# Patient Record
Sex: FEMALE | Race: BLACK OR AFRICAN AMERICAN | Employment: PART TIME | URBAN - METROPOLITAN AREA
[De-identification: names, ages, dates, MRNs, and addresses within clinical notes are randomized per-mention and may not be internally consistent; named-entity substitution may affect disease eponyms.]

---

## 2017-02-20 ENCOUNTER — OFFICE VISIT (OUTPATIENT)
Dept: OBGYN CLINIC | Facility: CLINIC | Age: 47
End: 2017-02-20

## 2017-02-20 VITALS
HEIGHT: 70 IN | WEIGHT: 293 LBS | BODY MASS INDEX: 41.95 KG/M2 | SYSTOLIC BLOOD PRESSURE: 122 MMHG | DIASTOLIC BLOOD PRESSURE: 76 MMHG

## 2017-02-20 DIAGNOSIS — Z01.419 WOMEN'S ANNUAL ROUTINE GYNECOLOGICAL EXAMINATION: Primary | ICD-10-CM

## 2017-02-20 PROBLEM — Z87.42 HISTORY OF PCOS: Status: ACTIVE | Noted: 2017-02-20

## 2017-02-20 PROBLEM — E66.9 OBESITY: Status: ACTIVE | Noted: 2017-02-20

## 2017-02-20 PROCEDURE — 99396 PREV VISIT EST AGE 40-64: CPT | Performed by: OBSTETRICS & GYNECOLOGY

## 2017-02-20 NOTE — PROGRESS NOTES
Yasmanymauricio Veronica is here for a checkup. Patient had Mirena IUD inserted June 2012 for very irregular heavy bleeding and anemia.   Prior to [address] IUD insertion patient was thinking of getting pregnant and had gone to reproductive endocrinologist and stopped pursuing ASSESSMENT:      Normal pelvic findings, IUD in place    PLAN:     I would like to see her once patient explores her pregnancy options. ORDERS:     No orders of the defined types were placed in this encounter.        PRESCRIPTIONS:       No pres

## 2017-07-10 ENCOUNTER — OFFICE VISIT (OUTPATIENT)
Dept: OBGYN CLINIC | Facility: CLINIC | Age: 47
End: 2017-07-10

## 2017-07-10 VITALS — SYSTOLIC BLOOD PRESSURE: 120 MMHG | DIASTOLIC BLOOD PRESSURE: 82 MMHG

## 2017-07-10 DIAGNOSIS — Z30.433 ENCOUNTER FOR IUD REMOVAL AND REINSERTION: Primary | ICD-10-CM

## 2017-07-10 PROBLEM — Z97.5 IUD CONTRACEPTION: Status: ACTIVE | Noted: 2017-07-10

## 2017-07-10 PROCEDURE — 58300 INSERT INTRAUTERINE DEVICE: CPT | Performed by: OBSTETRICS & GYNECOLOGY

## 2017-07-10 PROCEDURE — 88175 CYTOPATH C/V AUTO FLUID REDO: CPT | Performed by: OBSTETRICS & GYNECOLOGY

## 2017-07-10 PROCEDURE — 87624 HPV HI-RISK TYP POOLED RSLT: CPT | Performed by: OBSTETRICS & GYNECOLOGY

## 2017-07-10 NOTE — PROGRESS NOTES
Tati Loose is here for removal of Mirena IUD and insertion of new Mirena IUD. I discussed procedure of insertion, removal with her. Also discussed was the risks of IUD, benefits, alternatives.   Patient would like to proceed with removal of old Mirena IUD and above 100°F, severe abdominal pain, spontaneous expulsion of IUD. Planning on seeing her again in 2 months.     Alverna Romberg, MD  2:02 PM  7/10/2017

## 2017-07-11 LAB — HPV I/H RISK 1 DNA SPEC QL NAA+PROBE: NEGATIVE

## 2017-09-12 ENCOUNTER — OFFICE VISIT (OUTPATIENT)
Dept: OBGYN CLINIC | Facility: CLINIC | Age: 47
End: 2017-09-12

## 2017-09-12 VITALS
WEIGHT: 293 LBS | DIASTOLIC BLOOD PRESSURE: 80 MMHG | HEIGHT: 70 IN | BODY MASS INDEX: 41.95 KG/M2 | SYSTOLIC BLOOD PRESSURE: 122 MMHG

## 2017-09-12 DIAGNOSIS — T83.32XD INTRAUTERINE CONTRACEPTIVE DEVICE THREADS LOST, SUBSEQUENT ENCOUNTER: Primary | ICD-10-CM

## 2017-09-12 PROCEDURE — 99213 OFFICE O/P EST LOW 20 MIN: CPT | Performed by: OBSTETRICS & GYNECOLOGY

## 2017-09-12 NOTE — PROGRESS NOTES
Hernesto Nugent here for IUD check. Patient had IUD inserted July of this year. She has no complaints. She has had minimal vaginal spotting. Patient has had 2 previous [address] IUDs.     On examination:  [address]  Obstetrics and Gynecology  F

## 2017-09-20 ENCOUNTER — ULTRASOUND ENCOUNTER (OUTPATIENT)
Dept: OBGYN CLINIC | Facility: CLINIC | Age: 47
End: 2017-09-20

## 2017-09-20 ENCOUNTER — OFFICE VISIT (OUTPATIENT)
Dept: OBGYN CLINIC | Facility: CLINIC | Age: 47
End: 2017-09-20

## 2017-09-20 DIAGNOSIS — T83.32XD INTRAUTERINE CONTRACEPTIVE DEVICE THREADS LOST, SUBSEQUENT ENCOUNTER: ICD-10-CM

## 2017-09-20 DIAGNOSIS — T83.32XD INTRAUTERINE CONTRACEPTIVE DEVICE THREADS LOST, SUBSEQUENT ENCOUNTER: Primary | ICD-10-CM

## 2017-09-20 PROCEDURE — 99212 OFFICE O/P EST SF 10 MIN: CPT | Performed by: OBSTETRICS & GYNECOLOGY

## 2017-09-20 PROCEDURE — 76857 US EXAM PELVIC LIMITED: CPT | Performed by: OBSTETRICS & GYNECOLOGY

## 2017-09-20 NOTE — PROGRESS NOTES
Rayna Graham is here following ultrasound for consultation. Ultrasound was performed because IUD strings were not visualized. Today's ultrasound shows IUD present in the endometrial cavity in normal position.   I discussed this with Maeve Gonsales and planning on seeing

## 2018-01-16 ENCOUNTER — HOSPITAL ENCOUNTER (OUTPATIENT)
Age: 48
Discharge: HOME | End: 2018-01-16
Attending: FAMILY MEDICINE
Payer: COMMERCIAL

## 2018-01-16 VITALS
DIASTOLIC BLOOD PRESSURE: 95 MMHG | RESPIRATION RATE: 20 BRPM | TEMPERATURE: 98 F | SYSTOLIC BLOOD PRESSURE: 131 MMHG | OXYGEN SATURATION: 95 % | HEART RATE: 91 BPM

## 2018-01-16 DIAGNOSIS — J02.9 ACUTE VIRAL PHARYNGITIS: ICD-10-CM

## 2018-01-16 DIAGNOSIS — J06.9 VIRAL UPPER RESPIRATORY TRACT INFECTION: Primary | ICD-10-CM

## 2018-01-16 DIAGNOSIS — J04.0 ACUTE LARYNGITIS: ICD-10-CM

## 2018-01-16 DIAGNOSIS — R03.0 ELEVATED BLOOD PRESSURE READING: ICD-10-CM

## 2018-01-16 PROCEDURE — 99204 OFFICE O/P NEW MOD 45 MIN: CPT

## 2018-01-16 PROCEDURE — 99203 OFFICE O/P NEW LOW 30 MIN: CPT

## 2018-01-16 RX ORDER — IBUPROFEN 600 MG/1
600 TABLET ORAL ONCE
Status: COMPLETED | OUTPATIENT
Start: 2018-01-16 | End: 2018-01-16

## 2018-01-16 RX ORDER — BENZONATATE 200 MG/1
200 CAPSULE ORAL 3 TIMES DAILY PRN
Qty: 30 CAPSULE | Refills: 0 | Status: SHIPPED | OUTPATIENT
Start: 2018-01-16 | End: 2018-01-26

## 2018-01-16 NOTE — ED INITIAL ASSESSMENT (HPI)
Pt began last week with a lot of sweating, 3 days ago began with a cough, and then lost her voice2 days ago, Now she has sore throat and laryngitis

## 2018-01-16 NOTE — ED PROVIDER NOTES
Patient Seen in: THE MEDICAL [facility] In [facility]    History   Patient presents with:  Cough/URI    Stated Complaint: sorethroat / cough 3 days    HPI    This 19-year-old female presents to the office with 3 days of sore throat, voice hoarseness, non retractions or tachypnea noted. SKIN: Warm and dry.       ED Course   Labs Reviewed - No data to display    ED Course as of Jan 16 1021  ------------------------------------------------------------       MDM     I advised the patient that she has a viral i

## 2018-03-23 ENCOUNTER — HOSPITAL ENCOUNTER (OUTPATIENT)
Age: 48
Discharge: HOME | End: 2018-03-23
Attending: EMERGENCY MEDICINE
Payer: COMMERCIAL

## 2018-03-23 VITALS
DIASTOLIC BLOOD PRESSURE: 92 MMHG | TEMPERATURE: 97 F | RESPIRATION RATE: 18 BRPM | HEART RATE: 92 BPM | SYSTOLIC BLOOD PRESSURE: 142 MMHG | OXYGEN SATURATION: 97 %

## 2018-03-23 DIAGNOSIS — J06.9 UPPER RESPIRATORY TRACT INFECTION, UNSPECIFIED TYPE: Primary | ICD-10-CM

## 2018-03-23 LAB — POCT RAPID STREP: NEGATIVE

## 2018-03-23 PROCEDURE — 99214 OFFICE O/P EST MOD 30 MIN: CPT

## 2018-03-23 PROCEDURE — 99213 OFFICE O/P EST LOW 20 MIN: CPT

## 2018-03-23 PROCEDURE — 87081 CULTURE SCREEN ONLY: CPT | Performed by: EMERGENCY MEDICINE

## 2018-03-23 PROCEDURE — 87430 STREP A AG IA: CPT | Performed by: EMERGENCY MEDICINE

## 2018-03-23 NOTE — ED PROVIDER NOTES
Patient Seen in: THE [facility] In [facility]    History   Patient presents with:  Cough/URI  Post Nasal Drip    Stated Complaint: cough, painful throat when coughing ,drainage    HPI  Patient is a 51-year-old female who states that for the last tenderness, good capillary refill. SKIN: No rash, good turgor. NEURO: Patient answers questions appropriately. No focal deficits appreciated.          ED Course     Labs Reviewed   POCT RAPID STREP - Normal   GRP A STREP CULT, THROAT       ED Course as o

## 2018-11-12 ENCOUNTER — OCC HEALTH (OUTPATIENT)
Dept: OCCUPATIONAL MEDICINE | Age: 48
End: 2018-11-12
Attending: PHYSICIAN ASSISTANT

## 2020-02-07 ENCOUNTER — OFFICE VISIT (OUTPATIENT)
Dept: OBGYN CLINIC | Facility: CLINIC | Age: 50
End: 2020-02-07
Payer: COMMERCIAL

## 2020-02-07 VITALS
DIASTOLIC BLOOD PRESSURE: 104 MMHG | WEIGHT: 293 LBS | BODY MASS INDEX: 41.95 KG/M2 | SYSTOLIC BLOOD PRESSURE: 145 MMHG | HEIGHT: 70 IN

## 2020-02-07 DIAGNOSIS — Z01.419 WOMEN'S ANNUAL ROUTINE GYNECOLOGICAL EXAMINATION: Primary | ICD-10-CM

## 2020-02-07 PROBLEM — G47.30 SLEEP APNEA: Status: ACTIVE | Noted: 2020-02-07

## 2020-02-07 PROBLEM — M54.50 LOW BACK PAIN: Status: ACTIVE | Noted: 2020-02-07

## 2020-02-07 PROBLEM — E28.2 PCOS (POLYCYSTIC OVARIAN SYNDROME): Status: ACTIVE | Noted: 2020-02-07

## 2020-02-07 PROBLEM — E66.01 MORBID OBESITY (HCC): Status: ACTIVE | Noted: 2020-02-07

## 2020-02-07 PROBLEM — Z80.3 FAMILY HISTORY OF BREAST CANCER: Status: ACTIVE | Noted: 2020-02-07

## 2020-02-07 PROCEDURE — 99396 PREV VISIT EST AGE 40-64: CPT | Performed by: OBSTETRICS & GYNECOLOGY

## 2020-02-07 PROCEDURE — 88175 CYTOPATH C/V AUTO FLUID REDO: CPT | Performed by: OBSTETRICS & GYNECOLOGY

## 2020-02-07 PROCEDURE — 87624 HPV HI-RISK TYP POOLED RSLT: CPT | Performed by: OBSTETRICS & GYNECOLOGY

## 2020-02-07 RX ORDER — POLYETHYLENE GLYCOL-3350 AND ELECTROLYTES 236; 6.74; 5.86; 2.97; 22.74 G/274.31G; G/274.31G; G/274.31G; G/274.31G; G/274.31G
POWDER, FOR SOLUTION ORAL
COMMUNITY
Start: 2020-01-15 | End: 2021-04-09

## 2020-02-07 NOTE — PROGRESS NOTES
Ryan Blankenship is here for a checkup. I have not seen her since September 2017. Patient had Mirena IUD inserted July 2017 and had ultrasound for lost IUD strings and ultrasound showed presence of IUD in endometrial cavity.     Her mammogram by January 2020 at [facility] MCG/24HR Intrauterine IUD 1 each by Intrauterine route once. Family History     History reviewed. No pertinent family history.     Social History     Social History    Socioeconomic History      Marital status:       Spouse name: Not on file auscultation  CARDIOVASCULAR: normal S1, S2, RRR  BREASTS: Very large, pendulous firm, nontendder, no palpable masses or nodes, no nipple discharge, no skin changes, no axillary adenopathy  ABDOMEN: Soft, non distended; non tender, no masses  GYNE/: Exte

## 2020-02-09 LAB — HPV I/H RISK 1 DNA SPEC QL NAA+PROBE: NEGATIVE

## 2020-02-24 ENCOUNTER — WALK IN (OUTPATIENT)
Dept: URGENT CARE | Age: 50
End: 2020-02-24

## 2020-02-24 DIAGNOSIS — Z11.1 SCREENING-PULMONARY TB: Primary | ICD-10-CM

## 2020-02-24 PROCEDURE — 86580 TB INTRADERMAL TEST: CPT | Performed by: NURSE PRACTITIONER

## 2020-02-26 ENCOUNTER — WALK IN (OUTPATIENT)
Dept: URGENT CARE | Age: 50
End: 2020-02-26

## 2020-02-26 DIAGNOSIS — Z11.1 ENCOUNTER FOR PPD SKIN TEST READING: Primary | ICD-10-CM

## 2020-02-26 LAB
INDURATION: 5 MM (ref 0–?)
SKIN TEST RESULT: NEGATIVE

## 2020-02-26 PROCEDURE — X1094 NO CHARGE VISIT: HCPCS | Performed by: NURSE PRACTITIONER

## 2021-04-09 ENCOUNTER — OFFICE VISIT (OUTPATIENT)
Dept: OBGYN CLINIC | Facility: CLINIC | Age: 51
End: 2021-04-09
Payer: COMMERCIAL

## 2021-04-09 VITALS — BODY MASS INDEX: 41.95 KG/M2 | HEIGHT: 70 IN | WEIGHT: 293 LBS

## 2021-04-09 DIAGNOSIS — Z01.419 WOMEN'S ANNUAL ROUTINE GYNECOLOGICAL EXAMINATION: Primary | ICD-10-CM

## 2021-04-09 DIAGNOSIS — Z23 NEED FOR VACCINATION: ICD-10-CM

## 2021-04-09 PROCEDURE — 99396 PREV VISIT EST AGE 40-64: CPT | Performed by: OBSTETRICS & GYNECOLOGY

## 2021-04-09 PROCEDURE — 3008F BODY MASS INDEX DOCD: CPT | Performed by: OBSTETRICS & GYNECOLOGY

## 2021-04-09 PROCEDURE — 87624 HPV HI-RISK TYP POOLED RSLT: CPT | Performed by: OBSTETRICS & GYNECOLOGY

## 2021-04-09 PROCEDURE — 88175 CYTOPATH C/V AUTO FLUID REDO: CPT | Performed by: OBSTETRICS & GYNECOLOGY

## 2021-04-09 RX ORDER — CYCLOBENZAPRINE HCL 5 MG
5 TABLET ORAL 3 TIMES DAILY PRN
COMMUNITY
Start: 2021-03-21

## 2021-04-09 NOTE — PROGRESS NOTES
Uzma Alvarado is here for gynecologic checkup. Patient has no complaints. Her menstrual cycles are almost nonexistent due to Mirena IUD. Mirena IUD needs to be replaced next year. Patient is scheduled to have mammogram in 2 weeks.     Her colonoscopy April 2, Not on file      Number of children: Not on file      Years of education: Not on file      Highest education level: Not on file    Occupational History      Not on file    Tobacco Use      Smoking status: Never Smoker      Smokeless tobacco: Never Used Genitalia: Normal appearing, no lesions. Urethral meatus appear wnl, no abnormal discharge or lesions noted.            Bladder: well supported, urethra wnl, no lesions or fissures                     Vagina: normal pink mucosa, no lesions, normal clear dis

## 2022-11-10 ENCOUNTER — OFFICE VISIT (OUTPATIENT)
Dept: INTERNAL MEDICINE CLINIC | Facility: CLINIC | Age: 52
End: 2022-11-10
Payer: COMMERCIAL

## 2022-11-10 VITALS
SYSTOLIC BLOOD PRESSURE: 130 MMHG | DIASTOLIC BLOOD PRESSURE: 80 MMHG | WEIGHT: 293 LBS | HEART RATE: 80 BPM | RESPIRATION RATE: 16 BRPM | HEIGHT: 68 IN | BODY MASS INDEX: 44.41 KG/M2

## 2022-11-10 DIAGNOSIS — E66.01 OBESITY, MORBID, BMI 50 OR HIGHER (HCC): ICD-10-CM

## 2022-11-10 DIAGNOSIS — Z51.81 THERAPEUTIC DRUG MONITORING: Primary | ICD-10-CM

## 2022-11-10 DIAGNOSIS — E28.2 PCOS (POLYCYSTIC OVARIAN SYNDROME): ICD-10-CM

## 2022-11-10 DIAGNOSIS — Z98.84 HISTORY OF LAPAROSCOPIC ADJUSTABLE GASTRIC BANDING: ICD-10-CM

## 2022-11-10 DIAGNOSIS — G47.39 OTHER SLEEP APNEA: ICD-10-CM

## 2022-11-10 PROCEDURE — 99204 OFFICE O/P NEW MOD 45 MIN: CPT | Performed by: NURSE PRACTITIONER

## 2022-11-10 PROCEDURE — 3079F DIAST BP 80-89 MM HG: CPT | Performed by: NURSE PRACTITIONER

## 2022-11-10 PROCEDURE — 3075F SYST BP GE 130 - 139MM HG: CPT | Performed by: NURSE PRACTITIONER

## 2022-11-10 PROCEDURE — 3008F BODY MASS INDEX DOCD: CPT | Performed by: NURSE PRACTITIONER

## 2022-11-10 NOTE — PATIENT INSTRUCTIONS
Welcome to the [facility] Management Program!!  Thank you for placing your trust in our health care team, I look forward to working with you along this journey to better health! Next steps:     1.  Schedule a personal nutrition consultation with one of our registered dieticians. Bring along your food journal (3 days minimum). See journal options below. 2.  Get ekg and a1c done      Please try to work on the following dietary changes this first month:  Daily protein recommendation to start: 100-120 grams  Daily carbohydrate:  <190g  Daily calories: 2,300  1. Drink water with meals and throughout the day, cut down on soda and/or juice if consumed. Consider flavored water options like Bubbly, Spindrift, Hint and Rey. 2.  Eat breakfast daily and focus on having protein with each meal, examples include: greek yogurt, cottage cheese, hard boiled egg, whole grain toast with peanut butter. 3.  Reduce refined carbohydrates and sugars which includes items such as sweets, as well as rice, pasta, and bread and make sure to choose whole grain options when having them with just 1 serving per meal about the size of your inner palm. 4.  Consume non starchy veggies daily working towards making them a good 50% of your daily food intake. Add them to lunch and dinner consistently. 5.  Start a daily probiotic: VSL#3 is recommended, (order on line at www.vsl3. com). Take 1 capsule daily with water for 30 days, then reduce to 1 every other day (this will reduce the cost). Capsules can be left out for 2 weeks, but then must be refrigerated. Please download venus My Fitness Ashok Diaz! Or Net Diary to monitor daily dietary intake and you will be able to see if you are eating the right amount of calories or too much or too little which would hinder weight loss. Additionally this will help to see your daily carbohydrate and protein intake.  When you set the venus up choose 1-2 lbs/week as a goal.  Keeping a paper food journal is an option as well to remain accountable for your choices- this is the start to mindful eating! A low calorie diet has been consistently shown to support weight loss. Continue or start exercising to help establish a routine. If not already exercising begin with 1 day and progress as able with long-term goal of 30 minutes 5 days a week at a minimum. Meditation daily can help manage and control stress. Chronic stress can make weight loss difficult. Exercising is one way to help with stress, but meditation using the CALM Colette or another comparable alternative can be done in your home or place of work with little time commitment. This Colette can also help work on behavior change and improve sleep. Check out the segment under Calm Masterclass and listen to The 4 Pillars of Health. A great way to begin learning about the foundation of lifestyle with practical tips to use in your every day. Check out www.yourweightmatters. org blog for continued daily support and education along this weight loss journey! Patient Resources:     Personal Training/Fitness Classes/Health Coaching     Josias Knox and Gutierrez Sophiaside @ [email or web address] Full fitness center with group fitness and personal training. Discount available as client of [facility]. Health Coaching and Personal Training with Radha Bethea at our [facility]- individual weekly coaching with option to add personal training and small group fitness classes targeted at weight loss- [phone] and/or email @ [email or web address]  360FIT [place] [email or web address] Group Fitness [phone] and/or email Vijaya Cardenas at [email or web address]  [address] with multiple locations: Aetna (www.Splango Media Holdings. Active Optical MEMS), Eat The Frog Fitness (www.Harpoon Medical. Active Optical MEMS), Fit Body Bootcamp (www.Dashi Intelligencep.Active Optical MEMS), Wizdee (www.SpeSo Health. com), The Exercise  (www.exercisecoach.Miproto)     Online Fitness  Fitness  on Whole Foods in 10 DVD series- www. rtojk31OYF. Miproto  Sit and Be Fit - Chair exercise series Www.sitandbefit. org  Hip Hop Fit with Israel Faustin at www.hiphopfit. net     Apps for on the [employer] 7 Minute Workout (orange box with white 7) - free on the go HIIT training colette  Peloton Colette @ StreetfaireHD     Nutrition Trackers and Tools  LoseIT! And My Fitness Pal apps and on line for tracking nutrition  NOOM - virtual health coaching  FitFoundation (healthy meals on the go) in [employer] @ wwwAccountable aawvevxtpuwtx5x. Nelly Cooks MD @ wwwCRMnext and Alex Shah (keto and low carb plans recommended) @ www. Trig Medical.UKX, Metabolic Meals @ www. MyMetabolicMeals. com - individual prepared meals to go  FlyCast, Ameibo, International Business Machines, Every Plate, iApp4Me- on line meal delivery programs for preparation at home  [place] Living Proof in [place] for homemade meals to go @ wwwCybEye. Miproto  Diet Doctor @ www. dietdoctor. com - low carb swaps  YummMarketYze - meal prep and planning colette (www.yummly. com)     Stress Management/Behavior/Mindful Eating  CALM meditation colette (www.calm. Miproto)  Headspace  Am I Hungry? Mindful eating virtual  colette  Www.yourweightmatters. org - Obesity Action Coalition sponsored Blog posts daily  Motivation colette (black box with white \")- daily supportive messages sent to your phone     Books/Video Education/Podcasts  Mindless Eating by Osiris Barton  Why We Get Sick by Kassandra Rebolledo (a book about insulin resistance)  Atomic Habits by Bere Cunningham (a book about taking small steps to promote greater behavior change)   Can't Hurt Me by Dillon Lockett (a book exploring the power of discipline in achieving your goals)  The End of Dieting: How to Live for Life by Dr. Brandi Cruz M.D. or listen to The [address] [facility] [address] Episode 61: Understanding \"Nutritarian\" Eating w/Dr. Brandi Cruz  Your Body in Balance:  The New Science of Food, Hormones, and Health by Dr. Madeleine Aleman  The Menopause Diet Plan by Mara Mayo and [facility] - A HOSP AT [facility]  The Complete Guide to fasting by Dr. Ivis Nunes, [address] [facility] by Claudia Matson, Ph.D, R.D. Weight Loss Surgery Will Not Treat Food Addiction by Harriet Buerger Ph.D  The [address] [place] [address] on plant based nutrition  Fed Up - documentary about obesity (Free on [place])  The Truth About Sugar - documentary on sugar (Free on [place], https://youHighTower Advisorsu. be/2V5wrtoXC6a)  The Dr. Janet Greenfield by Dr. Maryanne Gongora MD  Fitlosophy Fitspiration - journal to better health (found at [employer] in fitness aisle)  What Happened to You?- a look at the impact trauma has on behavior written by Uyen Ascencio and Dr. Kely Beth Again by Manuella Alpers - discovering your true self after trauma  Pina Cumberland talk on "[employer],Inc.", The Call to Courage  Podcasts: The Exam Room by the Physician's Committee, Nutrition Facts by Dr. Jacquelin Zamora    We are here to support you with weight loss, but please remember that you still need your primary care provider for your routine health maintenance.

## 2022-11-11 ENCOUNTER — LAB ENCOUNTER (OUTPATIENT)
Dept: LAB | Age: 52
End: 2022-11-11
Attending: NURSE PRACTITIONER
Payer: COMMERCIAL

## 2022-11-11 ENCOUNTER — PATIENT MESSAGE (OUTPATIENT)
Dept: INTERNAL MEDICINE CLINIC | Facility: CLINIC | Age: 52
End: 2022-11-11

## 2022-11-11 ENCOUNTER — EKG ENCOUNTER (OUTPATIENT)
Dept: LAB | Age: 52
End: 2022-11-11
Attending: NURSE PRACTITIONER
Payer: COMMERCIAL

## 2022-11-11 DIAGNOSIS — E66.01 OBESITY, MORBID, BMI 50 OR HIGHER (HCC): ICD-10-CM

## 2022-11-11 DIAGNOSIS — Z51.81 THERAPEUTIC DRUG MONITORING: ICD-10-CM

## 2022-11-11 LAB
ATRIAL RATE: 67 BPM
EST. AVERAGE GLUCOSE BLD GHB EST-MCNC: 117 MG/DL (ref 68–126)
HBA1C MFR BLD: 5.7 % (ref ?–5.7)
P AXIS: 74 DEGREES
P-R INTERVAL: 192 MS
Q-T INTERVAL: 412 MS
QRS DURATION: 78 MS
QTC CALCULATION (BEZET): 435 MS
R AXIS: 35 DEGREES
T AXIS: 17 DEGREES
VENTRICULAR RATE: 67 BPM

## 2022-11-11 PROCEDURE — 93010 ELECTROCARDIOGRAM REPORT: CPT | Performed by: INTERNAL MEDICINE

## 2022-11-11 PROCEDURE — 93005 ELECTROCARDIOGRAM TRACING: CPT

## 2022-11-11 PROCEDURE — 83036 HEMOGLOBIN GLYCOSYLATED A1C: CPT | Performed by: NURSE PRACTITIONER

## 2022-11-11 NOTE — TELEPHONE ENCOUNTER
From: Noy Comes  To:  TOAN Myrick  Sent: 11/11/2022 1:47 PM CST  Subject: Question regarding HEMOGLOBIN A1C    So what is the next step

## 2022-11-14 ENCOUNTER — PATIENT MESSAGE (OUTPATIENT)
Dept: INTERNAL MEDICINE CLINIC | Facility: CLINIC | Age: 52
End: 2022-11-14

## 2022-11-14 RX ORDER — PHENTERMINE HYDROCHLORIDE 15 MG/1
15 CAPSULE ORAL EVERY MORNING
Qty: 30 CAPSULE | Refills: 1 | Status: SHIPPED | OUTPATIENT
Start: 2022-11-14

## 2022-11-14 NOTE — TELEPHONE ENCOUNTER
From: Gretchen Service  To:  TOAN Castro  Sent: 11/11/2022 1:47 PM CST  Subject: Question regarding HEMOGLOBIN A1C    So what is the next step

## 2022-11-17 ENCOUNTER — PATIENT MESSAGE (OUTPATIENT)
Dept: INTERNAL MEDICINE CLINIC | Facility: CLINIC | Age: 52
End: 2022-11-17

## 2022-11-18 ENCOUNTER — PATIENT MESSAGE (OUTPATIENT)
Dept: INTERNAL MEDICINE CLINIC | Facility: CLINIC | Age: 52
End: 2022-11-18

## 2022-11-20 RX ORDER — PEN NEEDLE, DIABETIC 30 GX3/16"
1 NEEDLE, DISPOSABLE MISCELLANEOUS DAILY
Qty: 100 EACH | Refills: 1 | Status: SHIPPED | OUTPATIENT
Start: 2022-11-20

## 2022-11-20 RX ORDER — LIRAGLUTIDE 6 MG/ML
3 INJECTION, SOLUTION SUBCUTANEOUS DAILY
Qty: 15 ML | Refills: 1 | Status: SHIPPED | OUTPATIENT
Start: 2022-11-20

## 2023-01-26 ENCOUNTER — PATIENT MESSAGE (OUTPATIENT)
Dept: INTERNAL MEDICINE CLINIC | Facility: CLINIC | Age: 53
End: 2023-01-26

## 2023-01-26 ENCOUNTER — OFFICE VISIT (OUTPATIENT)
Dept: INTERNAL MEDICINE CLINIC | Facility: CLINIC | Age: 53
End: 2023-01-26
Payer: COMMERCIAL

## 2023-01-26 VITALS
SYSTOLIC BLOOD PRESSURE: 120 MMHG | WEIGHT: 293 LBS | DIASTOLIC BLOOD PRESSURE: 89 MMHG | RESPIRATION RATE: 16 BRPM | BODY MASS INDEX: 44.41 KG/M2 | HEIGHT: 68 IN | HEART RATE: 82 BPM | OXYGEN SATURATION: 97 %

## 2023-01-26 DIAGNOSIS — Z98.84 HISTORY OF LAPAROSCOPIC ADJUSTABLE GASTRIC BANDING: ICD-10-CM

## 2023-01-26 DIAGNOSIS — E28.2 PCOS (POLYCYSTIC OVARIAN SYNDROME): ICD-10-CM

## 2023-01-26 DIAGNOSIS — R73.03 PREDIABETES: ICD-10-CM

## 2023-01-26 DIAGNOSIS — E66.01 OBESITY, MORBID, BMI 50 OR HIGHER (HCC): ICD-10-CM

## 2023-01-26 DIAGNOSIS — Z51.81 THERAPEUTIC DRUG MONITORING: Primary | ICD-10-CM

## 2023-01-26 DIAGNOSIS — G47.39 OTHER SLEEP APNEA: ICD-10-CM

## 2023-01-26 PROCEDURE — 3079F DIAST BP 80-89 MM HG: CPT | Performed by: NURSE PRACTITIONER

## 2023-01-26 PROCEDURE — 99214 OFFICE O/P EST MOD 30 MIN: CPT | Performed by: NURSE PRACTITIONER

## 2023-01-26 PROCEDURE — 3074F SYST BP LT 130 MM HG: CPT | Performed by: NURSE PRACTITIONER

## 2023-01-26 PROCEDURE — 3008F BODY MASS INDEX DOCD: CPT | Performed by: NURSE PRACTITIONER

## 2023-01-26 RX ORDER — PHENTERMINE HYDROCHLORIDE 15 MG/1
15 CAPSULE ORAL EVERY MORNING
Qty: 30 CAPSULE | Refills: 1 | Status: SHIPPED | OUTPATIENT
Start: 2023-01-26

## 2023-01-26 NOTE — PATIENT INSTRUCTIONS
Next steps:  1. Fill your prescribed medication and take as discussed and prescribed: saxenda 3mg daily and phentermine 15mg   2. Schedule a personal nutrition consultation with one of our registered dieticians  3. Check with insurance on weekly injection (ie. MIGUEL ANGEL)      Please try to work on the following dietary changes:    1. Drink water with meals and throughout the day, cut down on soda and/or juice if consumed. Consider flavored water options like Bubbly, Spindrift, Hint and Rey. 2.  Eat breakfast daily and focus on having protein with each meal, examples include: greek yogurt, cottage cheese, hard boiled egg, whole grain toast with peanut butter. 3.  Reduce refined carbohydrates and sugars which includes items such as sweets, as well as rice, pasta, and bread and make sure to choose whole grain options when having them with just 1 serving per meal about the size of your inner palm. 4.  Consume non starchy veggies daily working towards making them a good 50% of your daily food intake. Add them to lunch and dinner consistently. 5.  Start a daily probiotic: VSL#3 is recommended, (order on line at www.vsl3. com). Take 1 capsule daily with water for 30 days, then reduce to 1 every other day (this will reduce the cost). Capsules can be left out for 2 weeks, but then must be refrigerated. Please download venus My Fitness Chika Shoulders! Or Net Diary to monitor daily dietary intake and you will be able to see if you are eating the right amount of calories or too much or too little which would hinder weight loss. Additionally this will help to see your daily carbohydrate and protein intake. When you set the venus up choose 1-2 lbs/week as a goal.  Keeping a paper food journal is an option as well to remain accountable for your choices- this is the start to mindful eating! A low calorie diet has been consistently shown to support weight loss. Continue or start exercising to help establish a routine.  If not already exercising begin with 1 day and progress as able with long-term goal of 30 minutes 5 days a week at a minimum. Meditation daily can help manage and control stress. Chronic stress can make weight loss difficult. Exercising is one way to help with stress, but meditation using the CALM Colette or another comparable alternative can be done in your home or place of work with little time commitment. This Colette can also help work on behavior change and improve sleep. Check out the segment under Calm Masterclass and listen to The 4 Pillars of Health. A great way to begin learning about the foundation of lifestyle with practical tips to use in your every day. Check out www.yourweightmatters. org blog for continued daily support and education along this weight loss journey! Patient Resources:     Personal Training/Fitness Classes/Health Coaching     Josias Knox and Gutierrez Sophiaside @ [email or web address]/ Full fitness center with group fitness and personal training. Discount available as client of [facility]. Health Coaching and Personal Training with Todd Ledesma at our [facility]- individual weekly coaching with option to add personal training and small group fitness classes targeted at weight loss- [phone] and/or email @ [email or web address]  360FIT [place] [email or web address] Group Fitness [phone] and/or email Reshma Monge at [email or web address]  [address] [facility] with multiple locations: Aetna (www.U4EA), Eat The Frog Fitness (www.Fashiolista. Guidekick), Fit Body Bootcamp (www.Ontuitivebodybootcamp.com), Open Box Technologies Fitness (www.Girl Meets Dress. Guidekick), The Exercise  (www.exercisecoach.Guidekick)     Online Fitness  Fitness  on Whole Foods in 10 DVD series- www. knslk27DLN. Guidekick  Sit and Be Fit - Chair exercise series Www.sitandbefit. org  Hip Hop Fit with Israel Faustin at www.hiphopfit. net     Apps for on the D.light Design 7 Minute Workout (orange box with white 7) - free on the go HIIT training colette  Peloton Colette @ www"Dash Labs, Inc."     Nutrition Trackers and Tools  LoseIT! And My Fitness Pal apps and on line for tracking nutrition  NOOM - virtual health coaching  FitFoundation (healthy meals on the go) in [facility] [place]-SCI @ www. whzsepjkedwwk8z. Luzma Moses MD @ www.Selltag and Caroline Jade (keto and low carb plans recommended) @ www. LKFRIF05.IXK, Metabolic Meals @ www. MyMetabolicMeals. com - individual prepared meals to go  Thrinacia, TransBiodiesel, International Business Machines, Every Plate, Vedero Software- on line meal delivery programs for preparation at home  AK Tip or Skip in [place] for homemade meals to go @ wwwInnovative Healthcare  Diet Doctor @ www. dietdoctor. com - low carb swaps  YuFliqq - meal prep and planning colette (www.yummly. com)     Stress Management/Behavior/Mindful Eating  CALM meditation colette (www.calmSkedo)  Headspace  Am I Hungry? Mindful eating virtual  colette  Www.yourweightmatters. org - Obesity Action Coalition sponsored Blog posts daily  Motivation colette (black box with white \")- daily supportive messages sent to your phone     Books/Video Education/Podcasts  Mindless Eating by Kathleen Dueñas  Why We Get Sick by Jacques Celestin (a book about insulin resistance)  Atomic Habits by Han Lynn (a book about taking small steps to promote greater behavior change)   Can't Hurt Me by Gerry Diaz (a book exploring the power of discipline in achieving your goals)  The End of Dieting: How to Live for Life by Dr. Rodrick Holder M.D. or listen to The [address] Episode 61: Understanding \"Nutritarian\" Eating w/Dr. Rodrick Holder  Your Body in Balance: The World Fuel Services Corporation of Food, Hormones, and Health by Dr. Gaye Knutson  The Menopause Diet Plan by oMlly Bonilla [facility] HOSP AT [facility]  The Complete Guide to fasting by Dr. Jose A Segundo, [address] [facility] by Kenn Jo, Ph.D, R.D.   Weight Loss Surgery Will Not Treat Food Addiction by Norman Riedel Ph.D  The Game Changers- Vaurumix Documentary on plant based nutrition  Fed Up - documentary about obesity (Free on [place])  The Truth About Sugar - documentary on sugar (Free on [place], https://youJamgleu. be/9D9tjipNX5z)  The Dr. Arlo Dakin by Dr. Yessenia Alonso MD  Fitlosophy Fitspiration - journal to better health (found at [employer] in fitness aisle)  What Happened to You?- a look at the impact trauma has on behavior written by Delon De Oliveira and Dr. Estefany Amado Again by Mahnaz Rice - discovering your true self after trauma  Cinda Santos talk on Treemo Labs, The Call to Courage  Podcasts: The Exam Room by the Physician's Committee, Nutrition Facts by Dr. Enid Rdz    We are here to support you with weight loss, but please remember that you still need your primary care provider for your routine health maintenance.

## 2023-01-27 ENCOUNTER — PATIENT MESSAGE (OUTPATIENT)
Dept: INTERNAL MEDICINE CLINIC | Facility: CLINIC | Age: 53
End: 2023-01-27

## 2023-01-27 RX ORDER — SEMAGLUTIDE 1.7 MG/.75ML
1.7 INJECTION, SOLUTION SUBCUTANEOUS WEEKLY
Qty: 3 ML | Refills: 0 | Status: SHIPPED | OUTPATIENT
Start: 2023-01-27 | End: 2023-02-18

## 2023-01-30 ENCOUNTER — TELEPHONE (OUTPATIENT)
Dept: INTERNAL MEDICINE CLINIC | Facility: CLINIC | Age: 53
End: 2023-01-30

## 2023-02-05 ENCOUNTER — PATIENT MESSAGE (OUTPATIENT)
Dept: INTERNAL MEDICINE CLINIC | Facility: CLINIC | Age: 53
End: 2023-02-05

## 2023-02-06 RX ORDER — SEMAGLUTIDE 1.7 MG/.75ML
1.7 INJECTION, SOLUTION SUBCUTANEOUS WEEKLY
Qty: 3 ML | Refills: 0 | Status: SHIPPED | OUTPATIENT
Start: 2023-02-06 | End: 2023-02-28

## 2023-02-06 NOTE — TELEPHONE ENCOUNTER
From: Aleja Russ  Sent: 2/5/2023 1:54 PM CST  To: [facility] Clinical Staff  Subject: thomas Duval I [facility] and they said they need a prescription for the wegovy can you please send it to them so they can start my medication

## 2023-02-15 ENCOUNTER — TELEPHONE (OUTPATIENT)
Dept: INTERNAL MEDICINE CLINIC | Facility: CLINIC | Age: 53
End: 2023-02-15

## 2023-02-15 NOTE — TELEPHONE ENCOUNTER
Guero concerned with order for Wegovy 1.7 mg - no titration history. Patient was on [facility] [address] [facility] - ok to dispense dose as ordered. Called to pharmacist Aron Claros.

## 2023-02-27 NOTE — TELEPHONE ENCOUNTER
Requesting Wegoy  LOV: 1/26/23  RTC: 2 months  Last Relevant Labs: na  Filled: 2/6/23 #3ml with 0 refills  Wegovy 1.7 mg     Future Appointments   Date Time Provider Rakesh Travis   3/30/2023 10:40 AM TOAN Anderson [id number]

## 2023-03-30 ENCOUNTER — OFFICE VISIT (OUTPATIENT)
Dept: INTERNAL MEDICINE CLINIC | Facility: CLINIC | Age: 53
End: 2023-03-30
Payer: COMMERCIAL

## 2023-03-30 VITALS
HEIGHT: 68 IN | OXYGEN SATURATION: 96 % | RESPIRATION RATE: 16 BRPM | HEART RATE: 88 BPM | BODY MASS INDEX: 44.41 KG/M2 | SYSTOLIC BLOOD PRESSURE: 122 MMHG | DIASTOLIC BLOOD PRESSURE: 84 MMHG | WEIGHT: 293 LBS

## 2023-03-30 DIAGNOSIS — Z98.84 HISTORY OF LAPAROSCOPIC ADJUSTABLE GASTRIC BANDING: ICD-10-CM

## 2023-03-30 DIAGNOSIS — G47.39 OTHER SLEEP APNEA: ICD-10-CM

## 2023-03-30 DIAGNOSIS — E28.2 PCOS (POLYCYSTIC OVARIAN SYNDROME): ICD-10-CM

## 2023-03-30 DIAGNOSIS — R73.03 PREDIABETES: ICD-10-CM

## 2023-03-30 DIAGNOSIS — Z51.81 THERAPEUTIC DRUG MONITORING: Primary | ICD-10-CM

## 2023-03-30 DIAGNOSIS — E66.01 OBESITY, MORBID, BMI 50 OR HIGHER (HCC): ICD-10-CM

## 2023-03-30 PROCEDURE — 99213 OFFICE O/P EST LOW 20 MIN: CPT | Performed by: NURSE PRACTITIONER

## 2023-03-30 PROCEDURE — 3008F BODY MASS INDEX DOCD: CPT | Performed by: NURSE PRACTITIONER

## 2023-03-30 PROCEDURE — 3079F DIAST BP 80-89 MM HG: CPT | Performed by: NURSE PRACTITIONER

## 2023-03-30 PROCEDURE — 3074F SYST BP LT 130 MM HG: CPT | Performed by: NURSE PRACTITIONER

## 2023-03-30 RX ORDER — IBUPROFEN 600 MG/1
TABLET ORAL
COMMUNITY
Start: 2023-01-30

## 2023-03-30 RX ORDER — PSEUDOEPHED/ACETAMINOPH/DIPHEN 30MG-500MG
TABLET ORAL
COMMUNITY
Start: 2023-02-01

## 2023-03-30 RX ORDER — PHENTERMINE HYDROCHLORIDE 15 MG/1
15 CAPSULE ORAL EVERY MORNING
Qty: 90 CAPSULE | Refills: 0 | Status: SHIPPED | OUTPATIENT
Start: 2023-03-30

## 2023-03-30 NOTE — PATIENT INSTRUCTIONS
Next steps:  1. Fill your prescribed medication and take as discussed and prescribed: wegovy 2.4mg and phentermine   2. Schedule a personal nutrition consultation with one of our registered dieticians     1. Drink water with meals and throughout the day, cut down on soda and/or juice if consumed. Consider flavored water options like Bubbly, Spindrift, Hint and Rey. 2.  Eat breakfast daily and focus on having protein with each meal, examples include: greek yogurt, cottage cheese, hard boiled egg, whole grain toast with peanut butter. 3.  Reduce refined carbohydrates and sugars which includes items such as sweets, as well as rice, pasta, and bread and make sure to choose whole grain options when having them with just 1 serving per meal about the size of your inner palm. 4.  Consume non starchy veggies daily working towards making them a good 50% of your daily food intake. Add them to lunch and dinner consistently. 5.  Start a daily probiotic: VSL#3 is recommended, (order on line at www.vsl3. com). Take 1 capsule daily with water for 30 days, then reduce to 1 every other day (this will reduce the cost). Capsules can be left out for 2 weeks, but then must be refrigerated. Please download venus My Fitness Negar Reading! Or Net Diary to monitor daily dietary intake and you will be able to see if you are eating the right amount of calories or too much or too little which would hinder weight loss. Additionally this will help to see your daily carbohydrate and protein intake. When you set the venus up choose 1-2 lbs/week as a goal.  Keeping a paper food journal is an option as well to remain accountable for your choices- this is the start to mindful eating! A low calorie diet has been consistently shown to support weight loss. Continue or start exercising to help establish a routine. If not already exercising begin with 1 day and progress as able with long-term goal of 30 minutes 5 days a week at a minimum. Meditation daily can help manage and control stress. Chronic stress can make weight loss difficult. Exercising is one way to help with stress, but meditation using the CALM Colette or another comparable alternative can be done in your home or place of work with little time commitment. This Colette can also help work on behavior change and improve sleep. Check out the segment under Calm Masterclass and listen to The 4 Pillars of Health. A great way to begin learning about the foundation of lifestyle with practical tips to use in your every day. Check out www.yourweightmatters. org blog for continued daily support and education along this weight loss journey! Patient Resources:     Personal Training/Fitness Classes/Health Coaching     Josias 112 and Gutierrez Sophiaside @ [email or web address] Full fitness center with group fitness and personal training. Discount available as client of [facility]. Health Coaching and Personal Training with Efrain Mena at our [facility]- individual weekly coaching with option to add personal training and small group fitness classes targeted at weight loss- [phone] and/or email @ [email or web address]  360FIT [place] [email or web address] Group Fitness [phone] and/or email Kate Laureano at [email or web address]  [address] [facility] with multiple locations: Aetna (www.Kythera Biopharmaceuticals), Limos.com The Radiant Communications (www.Znode. SGX Pharmaceuticals), Fit Body Bootcamp (www.Kliqedp.SGX Pharmaceuticals), shoply (www.Pocket Video), The Exercise  (www.exercisecoach.SGX Pharmaceuticals)     Online Fitness  Fitness  on Whole Foods in 10 DVD series- www. obmxx06GFX. SGX Pharmaceuticals  Sit and Be Fit - Chair exercise series Www.sitandbefit. org  Hip Hop Fit with Israel Faustin at www.hiphopfit. net     Apps for on the Stop Being Watched 7 Minute Workout (orange box with white 7) - free on the go HIIT training colette  Peloton Colette @ www. Questra     Nutrition Trackers and Tools  LoseIT! And My Fitness Pal apps and on line for tracking nutrition  NOOM - virtual health coaching  FitFoundation (healthy meals on the go) in [facility] @ www. imnjdeggivkev9q. Sabine Yu MD @ www.TopTenREVIEWSdMobilePaks and Dock Starch (keto and low carb plans recommended) @ www. SHNWOX53.AYK, Metabolic Meals @ www. MyMetabolicMeals. com - individual prepared meals to go  Textronics, Tudou, International Business Machines, Every Plate, Anesiva- on line meal delivery programs for preparation at home  [place] LaserLeap in [place] for homemade meals to go @ www1calendar. Mayberry Media  Diet Doctor @ www. dietdoctor. Mayberry Media - low carb swaps  Yummly - meal prep and planning colette (www.yummly. com)     Stress Management/Behavior/Mindful Eating  CALM meditation colette (www.Ilesfay Technology Group)  Headspace  Am I Hungry? Mindful eating virtual  colette  Www.yourweightmatters. org - Obesity Action Coalition sponsored Blog posts daily  Motivation colette (black box with white \")- daily supportive messages sent to your phone     Books/Video Education/Podcasts  Mindless Eating by Heather Peterson  Why We Get Sick by Cheng Kulkarni (a book about insulin resistance)  Atomic Habits by Candida Qureshi (a book about taking small steps to promote greater behavior change)   Can't Hurt Me by Mallory Remedies (a book exploring the power of discipline in achieving your goals)  The End of Dieting: How to Live for Life by Dr. Braulio Lloyd M.D. or listen to The [facility] Episode 61: Understanding \"Nutritarian\" Eating w/Dr. Braulio Lloyd  Your Body in Balance: The World Fuel Services Corporation of Food, Hormones, and Health by Dr. Sophia Shoemaker  The Menopause Diet Plan by Nanci Rasheed and [facility]OSP AT [facility]  The Complete Guide to fasting by Dr. Joe Loya, [address] [facility] by Damien Nicholson, Ph.D, R.D.   Weight Loss Surgery Will Not Treat Food Addiction by Yana Bailey Ph.D  The Game Changers- bitHound Documentary on plant based nutrition  Fed Up - documentary about obesity (Free on Utube)  The Truth About Sugar - documentary on sugar (Free on Utube, https://youtu. be/9O1vvdhAW4a)  The Dr. Tin Marshall by Dr. Robert Jaquez MD  Fitlosophy Fitspiration - journal to better health (found at [employer] in fitness aisle)  What Happened to You?- a look at the impact trauma has on behavior written by Bere Rocha and Dr. Davenport Lot Again by Genevieve Webb - discovering your true self after trauma  Geena Damico talk on [place], The Call to SurveySnap  Podcasts: The Exam Room by the Physician's Committee, Nutrition Facts by Dr. Maher Shows    We are here to support you with weight loss, but please remember that you still need your primary care provider for your routine health maintenance.

## 2023-05-04 RX ORDER — SEMAGLUTIDE 2.4 MG/.75ML
INJECTION, SOLUTION SUBCUTANEOUS
Qty: 9 ML | Refills: 0 | Status: SHIPPED | OUTPATIENT
Start: 2023-05-04

## 2023-06-09 ENCOUNTER — LAB ENCOUNTER (OUTPATIENT)
Dept: LAB | Facility: HOSPITAL | Age: 53
End: 2023-06-09
Attending: OBSTETRICS & GYNECOLOGY
Payer: COMMERCIAL

## 2023-06-09 ENCOUNTER — PATIENT MESSAGE (OUTPATIENT)
Dept: OBGYN CLINIC | Facility: CLINIC | Age: 53
End: 2023-06-09

## 2023-06-09 ENCOUNTER — OFFICE VISIT (OUTPATIENT)
Dept: OBGYN CLINIC | Facility: CLINIC | Age: 53
End: 2023-06-09

## 2023-06-09 ENCOUNTER — TELEPHONE (OUTPATIENT)
Dept: OBGYN CLINIC | Facility: CLINIC | Age: 53
End: 2023-06-09

## 2023-06-09 VITALS
WEIGHT: 293 LBS | DIASTOLIC BLOOD PRESSURE: 86 MMHG | HEART RATE: 89 BPM | BODY MASS INDEX: 50 KG/M2 | SYSTOLIC BLOOD PRESSURE: 141 MMHG

## 2023-06-09 DIAGNOSIS — Z01.419 ENCOUNTER FOR GYNECOLOGICAL EXAMINATION WITHOUT ABNORMAL FINDING: ICD-10-CM

## 2023-06-09 DIAGNOSIS — N92.6 MENSES, IRREGULAR: Primary | ICD-10-CM

## 2023-06-09 DIAGNOSIS — Z12.31 VISIT FOR SCREENING MAMMOGRAM: ICD-10-CM

## 2023-06-09 DIAGNOSIS — N92.6 MENSES, IRREGULAR: ICD-10-CM

## 2023-06-09 LAB
ESTRADIOL SERPL-MCNC: 30.9 PG/ML
FSH SERPL-ACNC: 25.7 MIU/ML
LH SERPL-ACNC: 30.4 MIU/ML

## 2023-06-09 PROCEDURE — 82670 ASSAY OF TOTAL ESTRADIOL: CPT

## 2023-06-09 PROCEDURE — 83001 ASSAY OF GONADOTROPIN (FSH): CPT

## 2023-06-09 PROCEDURE — 36415 COLL VENOUS BLD VENIPUNCTURE: CPT

## 2023-06-09 PROCEDURE — 83002 ASSAY OF GONADOTROPIN (LH): CPT

## 2023-06-09 NOTE — TELEPHONE ENCOUNTER
Please call pt and tell her that her IUD was placed in 7/10/2017 and since it was placed for bleeding purposes it is due to be exchanged. Otherwise if just for contraception would need to be replaced in 2024. Will decide about whether to replace her mirena based on her hormone tests drawn today since she is 51yo.

## 2023-06-10 NOTE — TELEPHONE ENCOUNTER
From: Laura Ho  To: Evelyn Lopez.  MD Alina  Sent: 6/9/2023 4:46 PM CDT  Subject: Mammogram     Good evening Dr Castillo Barnes already had my mammogram on June 1,2023 at [address] showed the nurse my test results during my visit with you off my [email or web address] venus

## 2023-06-10 NOTE — TELEPHONE ENCOUNTER
To CAP to please advise, does IUD need to be replaced or removed based on hormone labs drawn on 6/9?

## 2023-06-12 NOTE — TELEPHONE ENCOUNTER
Thierry Murphy MD  P [facility] Ob/Gyne Clinical Staff  Pt labs are in the menopausal range so she should not need IUD replaced when it is removed later this year. Yazmin sent to pt with this information.

## 2023-06-29 ENCOUNTER — OFFICE VISIT (OUTPATIENT)
Dept: INTERNAL MEDICINE CLINIC | Facility: CLINIC | Age: 53
End: 2023-06-29
Payer: COMMERCIAL

## 2023-06-29 VITALS
WEIGHT: 293 LBS | BODY MASS INDEX: 44.41 KG/M2 | HEART RATE: 78 BPM | SYSTOLIC BLOOD PRESSURE: 144 MMHG | RESPIRATION RATE: 16 BRPM | DIASTOLIC BLOOD PRESSURE: 84 MMHG | HEIGHT: 68 IN

## 2023-06-29 DIAGNOSIS — G47.39 OTHER SLEEP APNEA: ICD-10-CM

## 2023-06-29 DIAGNOSIS — E66.01 OBESITY, MORBID, BMI 50 OR HIGHER (HCC): ICD-10-CM

## 2023-06-29 DIAGNOSIS — Z51.81 THERAPEUTIC DRUG MONITORING: Primary | ICD-10-CM

## 2023-06-29 DIAGNOSIS — E28.2 PCOS (POLYCYSTIC OVARIAN SYNDROME): ICD-10-CM

## 2023-06-29 DIAGNOSIS — R73.03 PREDIABETES: ICD-10-CM

## 2023-06-29 PROCEDURE — 3079F DIAST BP 80-89 MM HG: CPT | Performed by: NURSE PRACTITIONER

## 2023-06-29 PROCEDURE — 3077F SYST BP >= 140 MM HG: CPT | Performed by: NURSE PRACTITIONER

## 2023-06-29 PROCEDURE — 99213 OFFICE O/P EST LOW 20 MIN: CPT | Performed by: NURSE PRACTITIONER

## 2023-06-29 PROCEDURE — 3008F BODY MASS INDEX DOCD: CPT | Performed by: NURSE PRACTITIONER

## 2023-06-29 RX ORDER — PHENTERMINE HYDROCHLORIDE 15 MG/1
15 CAPSULE ORAL EVERY MORNING
Qty: 90 CAPSULE | Refills: 0 | Status: CANCELLED | OUTPATIENT
Start: 2023-06-29

## 2023-06-29 RX ORDER — SEMAGLUTIDE 2.4 MG/.75ML
2.4 INJECTION, SOLUTION SUBCUTANEOUS WEEKLY
Qty: 9 ML | Refills: 0 | Status: CANCELLED | OUTPATIENT
Start: 2023-06-29

## 2023-06-29 RX ORDER — PHENTERMINE HYDROCHLORIDE 30 MG/1
30 CAPSULE ORAL EVERY MORNING
Qty: 30 CAPSULE | Refills: 2 | Status: SHIPPED | OUTPATIENT
Start: 2023-06-29

## 2023-07-24 DIAGNOSIS — E66.01 OBESITY, MORBID, BMI 50 OR HIGHER (HCC): Primary | ICD-10-CM

## 2023-07-25 RX ORDER — SEMAGLUTIDE 2.4 MG/.75ML
INJECTION, SOLUTION SUBCUTANEOUS
Qty: 9 ML | Refills: 0 | Status: SHIPPED | OUTPATIENT
Start: 2023-07-25

## 2023-07-25 NOTE — TELEPHONE ENCOUNTER
Requesting   Requested Prescriptions     Pending Prescriptions Disp Refills    WEGOVY 2.4 MG/0.75ML Subcutaneous Solution Auto-injector [Pharmacy Med Name: WEGOVY PEN INJ 0.75ML 4'S 2.4MG] 9 mL 3     Sig: INJECT 0.75 ML ( 2.4 MG TOTAL ) UNDER THE SKIN ONCE A WEEK.      LOV: 6/29/23  RTC: 3 months  Filled: 5/4/23 #9 with 0 refills    Future Appointments   Date Time Provider Rakesh Travis   9/7/2023 10:40 AM TOAN Rankin [id number]

## 2023-09-07 ENCOUNTER — TELEMEDICINE (OUTPATIENT)
Dept: INTERNAL MEDICINE CLINIC | Facility: CLINIC | Age: 53
End: 2023-09-07
Payer: COMMERCIAL

## 2023-09-07 ENCOUNTER — PATIENT MESSAGE (OUTPATIENT)
Dept: INTERNAL MEDICINE CLINIC | Facility: CLINIC | Age: 53
End: 2023-09-07

## 2023-09-07 DIAGNOSIS — E66.01 OBESITY, MORBID, BMI 50 OR HIGHER (HCC): ICD-10-CM

## 2023-09-07 DIAGNOSIS — E28.2 PCOS (POLYCYSTIC OVARIAN SYNDROME): ICD-10-CM

## 2023-09-07 DIAGNOSIS — G47.39 OTHER SLEEP APNEA: ICD-10-CM

## 2023-09-07 DIAGNOSIS — Z51.81 THERAPEUTIC DRUG MONITORING: Primary | ICD-10-CM

## 2023-09-07 DIAGNOSIS — R73.03 PREDIABETES: ICD-10-CM

## 2023-09-07 DIAGNOSIS — Z98.84 HISTORY OF LAPAROSCOPIC ADJUSTABLE GASTRIC BANDING: ICD-10-CM

## 2023-09-07 PROCEDURE — 99213 OFFICE O/P EST LOW 20 MIN: CPT | Performed by: NURSE PRACTITIONER

## 2023-09-07 NOTE — PATIENT INSTRUCTIONS
Next steps:  1. Fill your prescribed medication and take as discussed and prescribed: wegovy 2.4mg weekly and phentermine 30mg   2. Schedule a personal nutrition consultation with one of our registered dieticians     Please try to work on the following dietary changes:    1. Drink water with meals and throughout the day, cut down on soda and/or juice if consumed. Consider flavored water options like Bubbly, Spindrift, Hint and Rey. 2.  Eat breakfast daily and focus on having protein with each meal, examples include: greek yogurt, cottage cheese, hard boiled egg, whole grain toast with peanut butter. 3.  Reduce refined carbohydrates and sugars which includes items such as sweets, as well as rice, pasta, and bread and make sure to choose whole grain options when having them with just 1 serving per meal about the size of your inner palm. 4.  Consume non starchy veggies daily working towards making them a good 50% of your daily food intake. Add them to lunch and dinner consistently. 5.  Start a daily probiotic: VSL#3 is recommended, (order on line at www.vsl3. com). Take 1 capsule daily with water for 30 days, then reduce to 1 every other day (this will reduce the cost). Capsules can be left out for 2 weeks, but then must be refrigerated. Please download venus My Fitness Fabiano Garrido! Or Net Diary to monitor daily dietary intake and you will be able to see if you are eating the right amount of calories or too much or too little which would hinder weight loss. Additionally this will help to see your daily carbohydrate and protein intake. When you set the venus up choose 1-2 lbs/week as a goal.  Keeping a paper food journal is an option as well to remain accountable for your choices- this is the start to mindful eating! A low calorie diet has been consistently shown to support weight loss. Continue or start exercising to help establish a routine.  If not already exercising begin with 1 day and progress as able with long-term goal of 30 minutes 5 days a week at a minimum. Meditation daily can help manage and control stress. Chronic stress can make weight loss difficult. Exercising is one way to help with stress, but meditation using the CALM Colette or another comparable alternative can be done in your home or place of work with little time commitment. This Colette can also help work on behavior change and improve sleep. Check out the segment under Calm Masterclass and listen to The 4 Pillars of Health. A great way to begin learning about the foundation of lifestyle with practical tips to use in your every day. Check out www.yourweightmatters. org blog for continued daily support and education along this weight loss journey! Patient Resources:     Personal Training/Fitness Classes/Health Coaching     Josias Knox and Gutierrez Sophiaside @ [email or web address]/ Full fitness center with group fitness and personal training. Discount available as client of [facility]. Health Coaching and Personal Training with Joshua Metz at our [employer]- individual weekly coaching with option to add personal training and small group fitness classes targeted at weight loss- [phone] and/or email @ Jamey Cogan. [email or web address]  360FIT [place] [email or web address] Group Fitness [phone] and/or email Hannah Krishnan at [email or web address]  [address] [facility] with multiple locations: Aetna (www.Our Family Kitchen), Eat The TravelMuse Fitness (www.TOK.tv. Aries Cove), Fit Body Bootcamp (www.Adonitbodybootcamp.Aries Cove), Artisan Pharma (www.Presence Networks. Aries Cove), The Exercise  (www.exercisecoach.Aries Cove)     Online Fitness  Fitness  on Whole Foods in 10 DVD series- www. tswzl36EOM. Aries Cove  Sit and Be Fit - Chair exercise series Www.sitandbefit. org  Hip Hop Fit with Israel Faustin at www.hiphopfit. net     Apps for on the Bank of New York Company 7 Minute Workout (orange box with white 7) - free on the go HIIT training colette  Peloton Colette @ www. Pangea Universal Holdings     Nutrition Trackers and Tools  LoseIT! And My Fitness Pal apps and on line for tracking nutrition  NOOM - virtual health coaching  FitFoundation (healthy meals on the go) in [facility] @ www. pzauqwtmjhgai5o. Travis Hernandez MD @ www.Inlet TechnologiesdBlume Distillation and Darron Hoffmann (keto and low carb plans recommended) @ www. ZREEFU34.LXN, Metabolic Meals @ www. MyMetabolicMeals. com - individual prepared meals to go  RV ID, Innov Analysis Systems, International Business Machines, Every Plate, Proteus Biomedical- on line meal delivery programs for preparation at home  AK Banki.ru in [place] for homemade meals to go @ wwwLitepoint  Diet Doctor @ www. dietdoctor. AtheroNova - low carb swaps  Invenergy - meal prep and planning colette (www.yummly. com)     Stress Management/Behavior/Mindful Eating  CALM meditation colette (www.calmDialoggy)  Headspace  Am I Hungry? Mindful eating virtual  colette  Www.yourweightmatters. org - Obesity Action Coalition sponsored Blog posts daily  Motivation colette (black box with white \")- daily supportive messages sent to your phone     Books/Video Education/Podcasts  Mindless Eating by Wilfred Simmons  Why We Get Sick by Micah Perla (a book about insulin resistance)  Atomic Habits by Diandra Valles (a book about taking small steps to promote greater behavior change)   Can't Hurt Me by Olimpia Hyatt (a book exploring the power of discipline in achieving your goals)  The End of Dieting: How to Live for Life by Dr. Marlen Goode M.D. or listen to The 1995 [facility] Episode 61: Understanding \"Nutritarian\" Eating w/Dr. Marlen Goode  Your Body in Balance: The World Fuel Services Corporation of Food, Hormones, and Health by Dr. Holly Mejia  The Menopause Diet Plan by Stacie Disla [facility] HOSP AT [facility]  The Complete Guide to fasting by Dr. Gucci Mullen, [address] [facility] by Ady Jeffery, Ph.D, R.D.   Weight Loss Surgery Will Not Treat Food Addiction by Dottie Fernando Ph.D  The Game Changers- Netflix Documentary on plant based nutrition  Fed Up - documentary about obesity (Free on Utube)  The Truth About Sugar - documentary on sugar (Free on Utube, https://youtu. be/3Q4rjffLA2h)  The Dr. Criselda Noyola by Dr. Rodrick Patel MD  Fitlosophy Fitspiration - journal to better health (found at [employer] in fitness aisle)  What Happened to You?- a look at the impact trauma has on behavior written by Kal Theodore and Dr. Ora Del Rosario Again by Pavan Tripp - discovering your true self after trauma  Tamiko Riggs talk on Netflix, The Call to Courage  Podcasts: The Exam Room by the Physician's Committee, Nutrition Facts by Dr. Stephon Hein    We are here to support you with weight loss, but please remember that you still need your primary care provider for your routine health maintenance.

## 2023-09-07 NOTE — PROGRESS NOTES
[facility] WEIGHT MANAGEMENT VIRTUAL ENCOUNTER     Amy Meehan verbally consents to a Virtual/Telephone Check-In service on 09/07/23   Patient understands and accepts financial responsibility for any deductible, co-insurance and/or co-pays associated with this service. HISTORY OF PRESENT ILLNESS  Patient presents with: Other: F/u on weight management     Amy Meehan is a 46year old female is being evaluated as a video visit using Telemedicine with live, interactive video and audio    Weight gain/loss since LOV based on home monitoring:   Home scale: #318 lbs  Has lost  # 7lbs since LOV 2 months ago     Compliance with medication: wegovy 2.4mg weekly and phentermine 30mg    Tolerating well, helping with decreasing appetite and no side effects     Had to change today's visit to video, since she couldn't leave work today  Is doing well! Went on cruise this past summer   Has noticed a little hair loss   Hasn't had any sciatica issues   Exercise/Activity: 5x/ week, via walking, free weights (3 days per week)   Nutrition: eating regular meals, +protein, minimal veggies. +interm. . tracking reports  Stress is manageable   Sleep: 7-8 hours/night, waking up feeling rested    Denies chest pain, shortness of breath, dizziness, blurred vision, headache, paresthesia, nausea/vomiting.      Wt Readings from Last 6 Encounters:  06/29/23 : (!) 325 lb (147.4 kg)  06/09/23 : (!) 328 lb (148.8 kg)  03/30/23 : (!) 341 lb (154.7 kg)  01/26/23 : (!) 363 lb (164.7 kg)  11/10/22 : (!) 383 lb (173.7 kg)  04/09/21 : (!) 357 lb (161.9 kg)         Subjective  REVIEW OF SYSTEMS  GENERAL HEALTH: feels well otherwise, denied any fevers chills or night sweats   RESPIRATORY: denies shortness of breath   CARDIOVASCULAR: denies chest pain  GI: denies abdominal pain  PSYCH: denies any mood changes    Objective  EXAM  Reviewed most recent set of vitals   Physical Exam:  GENERAL: well developed, well nourished, in no apparent distress, speaking in full sentences comfortably   SKIN: warm, pink, dry without rashes to exposed area   EYES: conjunctiva pink  HEENT: atraumatic, normocephalic  LUNGS: normal work of breathing, non labored  CARDIO: normal work, no exertion  EXTREMITIES: no cyanosis, no clubbing, no edema  NEURO: Oriented times three  PSYCH: pleasant, cooperative, normal mood and affect    No results found for: WBC, RBC, HGB, HCT, MCV, MCH, MCHC, RDW, PLT, MPV  No results found for: GLU, BUN, BUNCREA, CREATSERUM, ANIONGAP, GFR, GFRNAA, GFRAA, CA, OSMOCALC, ALKPHO, AST, ALT, ALKPHOS, BILT, TP, ALB, GLOBULIN, AGRATIO, NA, K, CL, CO2  Lab Results   Component Value Date     11/11/2022    A1C 5.7 (H) 11/11/2022     No results found for: CHOLEST, TRIG, HDL, LDL, VLDL, TCHDLRATIO, NONHDLC, CHOLHDLRATIO, NONHDLC, CALCNONHDL  No results found for: T4F, TSH, TSHT4  No results found for: B12, VITB12  No results found for: VITD, QVITD, HIPN07ZG    semaglutide-weight management (WEGOVY) 2.4 MG/0.75ML Subcutaneous Solution Auto-injector, INJECT 0.75 ML ( 2.4 MG TOTAL ) UNDER THE SKIN ONCE A WEEK., Disp: 9 mL, Rfl: 0  Phentermine HCl 30 MG Oral Cap, Take 1 capsule (30 mg total) by mouth every morning., Disp: 30 capsule, Rfl: 2  ibuprofen 600 MG Oral Tab, , Disp: , Rfl:   ACETAMINOPHEN EXTRA STRENGTH 500 MG Oral Tab, , Disp: , Rfl:   Insulin Pen Needle (PEN NEEDLES) 32G X 4 MM Does not apply Misc, Inject 1 each into the skin daily. , Disp: 100 each, Rfl: 1  cyclobenzaprine 5 MG Oral Tab, Take 1 tablet (5 mg total) by mouth 3 (three) times daily as needed. , Disp: , Rfl:   levonorgestrel 20 MCG/24HR Intrauterine IUD, 20 mcg (1 each total) by Intrauterine route once., Disp: , Rfl:     No current facility-administered medications on file prior to visit.       ASSESSMENT  Analyzed weight data:       Diagnoses and all orders for this visit:    Therapeutic drug monitoring    Obesity, morbid, BMI 50 or higher (HCC)    PCOS (polycystic ovarian syndrome)    Other sleep apnea    Prediabetes    History of laparoscopic adjustable gastric banding      PLAN  Continue with medications: wegovy 2.4mg weekly   Continue with medications: phentermine 30mg   --advised of side effects and adverse effects of this medication  Contradictions: none  Reviewed labs  PCOS, has done metformin in the past   Snoring, +bairon but doesn't want to use machine (encouraged to maybe get a new one done)   Hx of lap band in 2010   Prediabetes- last a1c was 5.7% on 11/2022  Keep up the good work   Hair loss, try adding in biotin   Advised to monitor blood pressure and pulse at home/ given parameters to review and contact provider. Nutrition: low carb diet/ recommended to eat breakfast daily/ regular protein intake  Follow up with dietitian and psychologist as recommended. Discussed the role of sleep and stress in weight management. Counseled on comprehensive weight loss plan including attention to nutrition, exercise and behavior/stress management for success. See patient instruction below for more details. Discussed strategies to overcome barriers to successful weight loss and weight maintenance  FITTE: ACSM recommendations (150-300 minutes/ week in active weight loss)       There are no Patient Instructions on file for this visit. No follow-ups on file. Patient verbalizes understanding. Total time spent on chart review, pre-charting, obtaining history, counseling, and educating, reviewing labs was 27 minutes. Pt understands phone/video evaluation is not a substitute for face to face examination or emergency care. Pt advised to go to the ER or call 911 for worsening symptoms or acute distress. Please note that the following visit was completed using two-way, real-time interactive audio and/or video communication.   This has been done in good nancy to provide continuity of care in the best interest of the provider-patient relationship, due to the ongoing public health crisis/national emergency and because of restrictions of visitation. There are limitations of this visit as no physical exam could be performed. Every conscious effort was taken to allow for sufficient and adequate time. This billing was spent on reviewing labs, medications, radiology tests and decision making. Appropriate medical decision-making and tests are ordered as detailed in the plan of care above. NOTE TO PATIENT: The 21st Century Cures Act makes clinical notes like these available to patients in the interest of transparency. Clinical notes are medical documents used by physicians and care providers to communicate with each other. These documents include medical language and terminology, abbreviations, and treatment information that may sound technical and at times possibly unfamiliar. In addition, at times, the verbiage may appear blunt or direct. These documents are one tool providers use to communicate relevant information and clinical opinions of the care providers in a way that allows common understanding of the clinical context.      Master Mireles, APRN  9/7/2023

## 2023-10-05 ENCOUNTER — PATIENT MESSAGE (OUTPATIENT)
Dept: INTERNAL MEDICINE CLINIC | Facility: CLINIC | Age: 53
End: 2023-10-05

## 2023-10-06 RX ORDER — PHENTERMINE HYDROCHLORIDE 37.5 MG/1
37.5 TABLET ORAL
Qty: 30 TABLET | Refills: 1 | Status: SHIPPED | OUTPATIENT
Start: 2023-10-06

## 2023-10-06 NOTE — TELEPHONE ENCOUNTER
From: Gretchen Service  To:  Master Credit  Sent: 10/5/2023 6:00 PM CDT  Subject: New Prescription     Good evening Dr Camryn Gomez    Per our previous conversation regarding the dose increase on my phentermine medication I am ready to go to the next step can you please call in the refill order to my [address] I have on file in [place] on [address] [facility]

## 2023-10-16 DIAGNOSIS — E66.01 OBESITY, MORBID, BMI 50 OR HIGHER (HCC): ICD-10-CM

## 2023-10-17 RX ORDER — SEMAGLUTIDE 2.4 MG/.75ML
INJECTION, SOLUTION SUBCUTANEOUS
Qty: 9 ML | Refills: 0 | Status: SHIPPED | OUTPATIENT
Start: 2023-10-17 | End: 2023-10-23

## 2023-10-17 NOTE — TELEPHONE ENCOUNTER
Requesting   Requested Prescriptions     Pending Prescriptions Disp Refills    WEGOVY 2.4 MG/0.75ML Subcutaneous Solution Auto-injector [Pharmacy Med Name: WEGOVY PEN INJ 0.75ML 4'S 2.4MG] 9 mL 3     Sig: INJECT 0.75 ML ( 2.4 MG TOTAL ) UNDER THE SKIN ONCE A WEEK.      LOV: 9/7/23  RTC: 3 months  Filled: 7/25/23 #9 with 0 refills    Future Appointments   Date Time Provider Rakesh Vásquezi   12/7/2023 11:00 AM TOAN Graf [id number]

## 2023-10-23 ENCOUNTER — TELEPHONE (OUTPATIENT)
Dept: INTERNAL MEDICINE CLINIC | Facility: CLINIC | Age: 53
End: 2023-10-23

## 2023-10-23 DIAGNOSIS — E66.01 OBESITY, MORBID, BMI 50 OR HIGHER (HCC): ICD-10-CM

## 2023-10-23 RX ORDER — SEMAGLUTIDE 2.4 MG/.75ML
2.4 INJECTION, SOLUTION SUBCUTANEOUS WEEKLY
Qty: 9 ML | Refills: 0 | Status: SHIPPED | OUTPATIENT
Start: 2023-10-23

## 2023-10-23 NOTE — TELEPHONE ENCOUNTER
LOV 9/7/23  Future Appointments   Date Time Provider Rakesh Angy   12/7/2023 11:00 AM TOAN Valdovinos [id number]     Last Rx was sent on 10/17/23 #9mL + 0 to [facility]. [facility] typically sends refill request as soon as they fill Rx, so that they have next one. I'm not sure if you'd like to send now or not.

## 2023-10-23 NOTE — TELEPHONE ENCOUNTER
Approved    Prior authorization approved Case ID: [id number]      Payer: Arun Clements 19    [phone]    [phone]   [facility]:[id number];KIKO:JONE; Review Type:Prior Auth; Coverage Start Date:09/23/2023; Coverage End Date:10/22/2024;    Approval Details    Authorized from September 23, 2023 to October 22, 2024      Electronic appeal: Not supported   View History     Medication Being Authorized     semaglutide-weight management (WEGOVY) 2.4 MG/0.75ML Subcutaneous Solution Auto-injector

## 2023-12-07 ENCOUNTER — OFFICE VISIT (OUTPATIENT)
Dept: INTERNAL MEDICINE CLINIC | Facility: CLINIC | Age: 53
End: 2023-12-07
Payer: COMMERCIAL

## 2023-12-07 VITALS
HEART RATE: 90 BPM | RESPIRATION RATE: 16 BRPM | WEIGHT: 293 LBS | SYSTOLIC BLOOD PRESSURE: 138 MMHG | DIASTOLIC BLOOD PRESSURE: 82 MMHG | BODY MASS INDEX: 44.41 KG/M2 | HEIGHT: 68 IN

## 2023-12-07 DIAGNOSIS — E28.2 PCOS (POLYCYSTIC OVARIAN SYNDROME): ICD-10-CM

## 2023-12-07 DIAGNOSIS — G47.39 OTHER SLEEP APNEA: ICD-10-CM

## 2023-12-07 DIAGNOSIS — R73.03 PREDIABETES: ICD-10-CM

## 2023-12-07 DIAGNOSIS — E66.01 OBESITY, MORBID, BMI 50 OR HIGHER (HCC): ICD-10-CM

## 2023-12-07 DIAGNOSIS — Z51.81 THERAPEUTIC DRUG MONITORING: Primary | ICD-10-CM

## 2023-12-07 PROCEDURE — 3008F BODY MASS INDEX DOCD: CPT | Performed by: NURSE PRACTITIONER

## 2023-12-07 PROCEDURE — 99214 OFFICE O/P EST MOD 30 MIN: CPT | Performed by: NURSE PRACTITIONER

## 2023-12-07 PROCEDURE — 3075F SYST BP GE 130 - 139MM HG: CPT | Performed by: NURSE PRACTITIONER

## 2023-12-07 PROCEDURE — 3079F DIAST BP 80-89 MM HG: CPT | Performed by: NURSE PRACTITIONER

## 2023-12-07 RX ORDER — PHENTERMINE HYDROCHLORIDE 37.5 MG/1
37.5 TABLET ORAL
Qty: 30 TABLET | Refills: 2 | Status: SHIPPED | OUTPATIENT
Start: 2023-12-07

## 2023-12-07 NOTE — PATIENT INSTRUCTIONS
Next steps:  1. Fill your prescribed medication and take as discussed and prescribed: wegovy 2.4mg weekly and phentermine 37.5mg   2. Schedule a personal nutrition consultation with one of our registered dieticians     Please try to work on the following dietary changes:  Daily protein recommendation to start:  grams  Daily carbohydrate: <160g  Daily calories: 2,000-2,100  1. Drink water with meals and throughout the day, cut down on soda and/or juice if consumed. Consider flavored water options like Bubbly, Spindrift, Hint and Rey. 2.  Eat breakfast daily and focus on having protein with each meal, examples include: greek yogurt, cottage cheese, hard boiled egg, whole grain toast with peanut butter. 3.  Reduce refined carbohydrates and sugars which includes items such as sweets, as well as rice, pasta, and bread and make sure to choose whole grain options when having them with just 1 serving per meal about the size of your inner palm. 4.  Consume non starchy veggies daily working towards making them a good 50% of your daily food intake. Add them to lunch and dinner consistently. 5.  Start a daily probiotic: VSL#3 is recommended, (order on line at www.vsl3. com). Take 1 capsule daily with water for 30 days, then reduce to 1 every other day (this will reduce the cost). Capsules can be left out for 2 weeks, but then must be refrigerated. Please download venus My Fitness [place]! Or Net Diary to monitor daily dietary intake and you will be able to see if you are eating the right amount of calories or too much or too little which would hinder weight loss. Additionally this will help to see your daily carbohydrate and protein intake. When you set the venus up choose 1-2 lbs/week as a goal.  Keeping a paper food journal is an option as well to remain accountable for your choices- this is the start to mindful eating! A low calorie diet has been consistently shown to support weight loss.      Continue or start exercising to help establish a routine. If not already exercising begin with 1 day and progress as able with long-term goal of 30 minutes 5 days a week at a minimum. Meditation daily can help manage and control stress. Chronic stress can make weight loss difficult. Exercising is one way to help with stress, but meditation using the CALM Colette or another comparable alternative can be done in your home or place of work with little time commitment. This Colette can also help work on behavior change and improve sleep. Check out the segment under Calm Masterclass and listen to The 4 Pillars of Health. A great way to begin learning about the foundation of lifestyle with practical tips to use in your every day. Check out www.yourweightmatters. org blog for continued daily support and education along this weight loss journey! Patient Resources:     Personal Training/Fitness Classes/Health Coaching     Josias Knox and Matt Deliaashly @ [email or web address]/ Full fitness center with group fitness and personal training. Discount available as client of [facility]. Health Coaching and Personal Training with Bradford Young at our [employer]- individual weekly coaching with option to add personal training and small group fitness classes targeted at weight loss- [phone] and/or email @ [email or web address] Technology. org  360FIT [place] [email or web address] Group Fitness [phone] and/or email Cooper Bonilla at [email or web address]. [place]  [address] [facility] with multiple locations: Aetna (www.ISH), Eat The Frog Fitness (www.Pathgather. [place]), Fit Body Bootcamp (www.Traveebodybootcamp.[place]), Labelby.me Fitness (www.Sand Sign. [place]), The Exercise  (www.exercisecoach.[place])     Online Fitness  Fitness  on [employer] in 10 DVD series- www. pkcyn98XKL. [place]  Sit and Be Fit - Chair exercise series Www.sitandbefit. org  Hip Hop Fit with Israel Faustin at www.hiphopfit. net     Apps for on the MediaPhy 7 Minute Workout (orange box with white 7) - free on the go HIIT training colette  Peloton Colette @ wwwEpy.io     Nutrition Trackers and Tools  LoseIT! And My Fitness Pal apps and on line for tracking nutrition  NOOM - virtual health coaching  FitFoundation (healthy meals on the go) in [facility] @ www. jivvlowtndbqu6a. Fara Magaña MD @ www.Hungry LocaltromdKadmon and Blaine Cornell (keto and low carb plans recommended) @ www. ZZJSIO45.VKE, Metabolic Meals @ www. InsightSquaredals. com - individual prepared meals to go  Elanti Systems, Insightera, [employer], Every Plate, [employer] and [employer] (CNHS)- on line meal delivery programs for preparation at home  [place] CaseStack in [place] for homemade meals to go @ wwwEdgeSpring. Pro[employer] Manufacturing  Diet Doctor @ www. dietdoctor. com - low carb swaps  Yummly - meal prep and planning colette (www.yummly. com)     Stress Management/Behavior/Mindful Eating  CALM meditation colette (www.calm. [employer])  Headspace  Am I Hungry? Mindful eating virtual  colette  Www.yourweightmatters. org - Obesity Action Coalition sponsored Blog posts daily  Motivation colette (black box with white \")- daily supportive messages sent to your phone     Books/Video Education/Podcasts  Mindless Eating by Marilynn Cockayne  Why We Get Sick by Octavia Keating (a book about insulin resistance)  Atomic Habits by Wong Ask (a book about taking small steps to promote greater behavior change)   Can't Hurt Me by Kala Alvarez (a book exploring the power of discipline in achieving your goals)  The End of Dieting: How to Live for Life by Dr. Yannick Lewis M.D. or listen to The [address] Episode 61: Understanding \"Nutritarian\" Eating w/Dr. Yannick Lewis  Your Body in Balance:  The [employer] of Food, Hormones, and Health by Dr. Marisol Harding  The Menopause Diet Plan by Renuka Astorga and [facility] AT [facility]  The Complete Guide to fasting by Dr. Bam Berry, [address] [facility] by Sony Huynh Felipe Meza, Ph.D, R.D. Weight Loss Surgery Will Not Treat Food Addiction by Yanet Perea Ph.D  The [address] [place] [address] on plant based nutrition  Fed Up - documentary about obesity (Free on [place])  The Truth About Sugar - documentary on sugar (Free on Utube, https://youtu. be/9A5wdmzWO9e)  The Dr. Adriel Rodriguez by Dr. Chris Tena MD  Fitlosophy Fitspiration - journal to better health (found at [employer] in fitness aisle)  What Happened to You?- a look at the impact trauma has on behavior written by Peter Nunes and Dr. Yeni Millan Again by Gretchen Kimble - discovering your true self after trauma  Aura Ayala talk on [employer]R [employer], The Call to Cozi Groupage  Podcasts: The Exam Room by the Physician's Committee, Nutrition Facts by Dr. Gaviota Crouch    We are here to support you with weight loss, but please remember that you still need your primary care provider for your routine health maintenance.

## 2024-01-08 DIAGNOSIS — E66.01 OBESITY, MORBID, BMI 50 OR HIGHER (HCC): ICD-10-CM

## 2024-01-08 RX ORDER — SEMAGLUTIDE 2.4 MG/.75ML
2.4 INJECTION, SOLUTION SUBCUTANEOUS WEEKLY
Qty: 9 ML | Refills: 0 | Status: SHIPPED | OUTPATIENT
Start: 2024-01-08

## 2024-01-08 NOTE — TELEPHONE ENCOUNTER
Requesting   Requested Prescriptions     Pending Prescriptions Disp Refills    semaglutide-weight management (WEGOVY) 2.4 MG/0.75ML Subcutaneous Solution Auto-injector 9 mL 0     Sig: Inject 0.75 mL (2.4 mg total) as directed once a week.     LOV: 12/7/23  RTC: 3 months  Filled: 10/23/23 #9 with 0 refills    Future Appointments   Date Time Provider Department Center   4/11/2024 11:00 AM Ana Rosa Reyes APRN [facility] [id number]

## 2024-03-21 DIAGNOSIS — E66.01 OBESITY, MORBID, BMI 50 OR HIGHER (HCC): ICD-10-CM

## 2024-03-21 RX ORDER — SEMAGLUTIDE 2.4 MG/.75ML
INJECTION, SOLUTION SUBCUTANEOUS
Qty: 9 ML | Refills: 3 | OUTPATIENT
Start: 2024-03-21

## 2024-03-21 NOTE — TELEPHONE ENCOUNTER
Requesting   Requested Prescriptions     Pending Prescriptions Disp Refills    WEGOVY 2.4 MG/0.75ML Subcutaneous Solution Auto-injector [Pharmacy Med Name: WEGOVY PEN INJ 0.75ML 4'S 2.4MG] 9 mL 3     Sig: INJECT 0.75 ML (2.4 MG TOTAL) AS DIRECTED ONCE A WEEK UNDER THE SKIN       LOV: 12/07/2023  RTC: in about 3 months  Filled: 01/08/2024 #9ml with 0 refills    Future Appointments   Date Time Provider Department Center   4/11/2024 11:00 AM Ana Rosa Reyes APRN [facility] [id number]

## 2024-03-29 DIAGNOSIS — E66.01 OBESITY, MORBID, BMI 50 OR HIGHER (HCC): Primary | ICD-10-CM

## 2024-03-30 NOTE — TELEPHONE ENCOUNTER
Requesting   Requested Prescriptions     Pending Prescriptions Disp Refills    PHENTERMINE HCL 37.5 MG Oral Tab [Pharmacy Med Name: PHENTERMINE 37.5MG TABLETS] 30 tablet 0     Sig: TAKE 1 TABLET(37.5 MG) BY MOUTH EVERY MORNING BEFORE BREAKFAST     LOV: 12/7/23  RTC: 3 months  Filled: 12/7/23 #30 with 2 refills    Future Appointments   Date Time Provider Department Center   4/11/2024 11:00 AM Ana Rosa Reyes APRN [facility] [id number]   8/30/2024 10:20 AM Ruby Fuller MD [facility]

## 2024-03-31 RX ORDER — PHENTERMINE HYDROCHLORIDE 37.5 MG/1
37.5 TABLET ORAL
Qty: 30 TABLET | Refills: 0 | Status: SHIPPED | OUTPATIENT
Start: 2024-03-31

## 2024-04-02 DIAGNOSIS — E66.01 OBESITY, MORBID, BMI 50 OR HIGHER (HCC): ICD-10-CM

## 2024-04-02 RX ORDER — SEMAGLUTIDE 2.4 MG/.75ML
INJECTION, SOLUTION SUBCUTANEOUS
Qty: 9 ML | Refills: 0 | Status: SHIPPED | OUTPATIENT
Start: 2024-04-02

## 2024-04-02 NOTE — TELEPHONE ENCOUNTER
Requesting   Requested Prescriptions     Pending Prescriptions Disp Refills    WEGOVY 2.4 MG/0.75ML Subcutaneous Solution Auto-injector [Pharmacy Med Name: WEGOVY PEN INJ 0.75ML 4'S 2.4MG] 9 mL 3     Sig: INJECT 0.75 ML (2.4 MG TOTAL) ONCE A WEEK AS DIRECTED     LOV: 12/7/23  RTC: 3 months  Filled: 1/8/24 #9 with 0 refills    Future Appointments   Date Time Provider Department Center   4/11/2024 11:00 AM Ana Rosa Reyes APRN [facility] [id number]   8/30/2024 10:20 AM Ruby Fuller MD [facility]

## 2024-04-11 ENCOUNTER — OFFICE VISIT (OUTPATIENT)
Dept: INTERNAL MEDICINE CLINIC | Facility: CLINIC | Age: 54
End: 2024-04-11
Payer: COMMERCIAL

## 2024-04-11 VITALS
HEIGHT: 68 IN | WEIGHT: 293 LBS | RESPIRATION RATE: 16 BRPM | BODY MASS INDEX: 44.41 KG/M2 | DIASTOLIC BLOOD PRESSURE: 80 MMHG | HEART RATE: 74 BPM | SYSTOLIC BLOOD PRESSURE: 122 MMHG

## 2024-04-11 DIAGNOSIS — E66.01 OBESITY, MORBID, BMI 50 OR HIGHER (HCC): ICD-10-CM

## 2024-04-11 DIAGNOSIS — R73.03 PREDIABETES: ICD-10-CM

## 2024-04-11 DIAGNOSIS — G47.39 OTHER SLEEP APNEA: ICD-10-CM

## 2024-04-11 DIAGNOSIS — E28.2 PCOS (POLYCYSTIC OVARIAN SYNDROME): ICD-10-CM

## 2024-04-11 DIAGNOSIS — Z51.81 THERAPEUTIC DRUG MONITORING: Primary | ICD-10-CM

## 2024-04-11 PROCEDURE — 3008F BODY MASS INDEX DOCD: CPT | Performed by: NURSE PRACTITIONER

## 2024-04-11 PROCEDURE — 99214 OFFICE O/P EST MOD 30 MIN: CPT | Performed by: NURSE PRACTITIONER

## 2024-04-11 PROCEDURE — 3079F DIAST BP 80-89 MM HG: CPT | Performed by: NURSE PRACTITIONER

## 2024-04-11 PROCEDURE — 3074F SYST BP LT 130 MM HG: CPT | Performed by: NURSE PRACTITIONER

## 2024-04-11 RX ORDER — PHENTERMINE HYDROCHLORIDE 37.5 MG/1
37.5 TABLET ORAL
Qty: 30 TABLET | Refills: 2 | Status: SHIPPED | OUTPATIENT
Start: 2024-04-29

## 2024-04-11 NOTE — PROGRESS NOTES
HISTORY OF PRESENT ILLNESS  Chief Complaint   Patient presents with    Weight Check     Same weight     Kathie Rothman is a 53 year old female here for follow up with medical weight loss program for the treatment of overweight, obesity, or morbid obesity.     Down 0 lbs  Compliant on wegovy 2.4mg weekly and phentermine 37.5mg     Tolerating well, helping with decreasing appetite and no side effects     High stress, with adoptive son (high anxiety)- hopefully will improve after this month  Got a new position with work, promoted   Exercise/Activity: 2x/ week, via walking, free weights (3 days per week)   Nutrition: eating regular meals, +protein, minimal veggies. +interm.. tracking reports  Meals out per week on average: 1-2  Stress is manageable   Sleep: 7-8 hours/night, waking up feeling rested    Denies chest pain, shortness of breath, dizziness, blurred vision, headache, paresthesia, nausea/vomiting.     Breakfast Lunch Dinner Snacks Fluids   Reviewed              Wt Readings from Last 6 Encounters:   04/11/24 (!) 320 lb (145.2 kg)   12/07/23 (!) 320 lb (145.2 kg)   06/29/23 (!) 325 lb (147.4 kg)   06/09/23 (!) 328 lb (148.8 kg)   03/30/23 (!) 341 lb (154.7 kg)   01/26/23 (!) 363 lb (164.7 kg)          REVIEW OF SYSTEMS  GENERAL: feels well otherwise, denied any fevers chills or night sweats   LUNGS: denies shortness of breath  CARDIOVASCULAR: denies chest pain  GI: denies abdominal pain  MUSCULOSKELETAL: denies back pain, joint pains   PSYCH: denies change in behavior or mood, denies feeling sad or depressed    EXAM  /80   Pulse 74   Resp 16   Ht 5' 8\" (1.727 m)   Wt (!) 320 lb (145.2 kg)   LMP 03/24/2024 (Exact Date)   BMI 48.66 kg/m²       GENERAL: well developed, well nourished, in no apparent distress, A/O x3  SKIN: no rashes, no suspicious lesions  HEENT: atraumatic, normocephalic, OP-clear, PERRLA  NECK: supple, no adenopathy  LUNGS: CTA in all fields, breathing non labored  CARDIO: RRR  without murmur  GI: +BS, NT/ND, no masses or HSM  EXTREMITIES: no cyanosis, no clubbing, no edema    No results found for: \"GLU\", \"BUN\", \"BUNCREA\", \"CREATSERUM\", \"ANIONGAP\", \"GFR\", \"GFRNAA\", \"GFRAA\", \"CA\", \"OSMOCALC\", \"ALKPHO\", \"AST\", \"ALT\", \"ALKPHOS\", \"BILT\", \"TP\", \"ALB\", \"GLOBULIN\", \"AGRATIO\", \"NA\", \"K\", \"CL\", \"CO2\"  Lab Results   Component Value Date     11/11/2022    A1C 5.7 (H) 11/11/2022     No results found for: \"CHOLEST\", \"TRIG\", \"HDL\", \"LDL\", \"VLDL\", \"TCHDLRATIO\", \"NONHDLC\", \"CHOLHDLRATIO\", \"CALCNONHDL\"  No results found for: \"B12\", \"VITB12\"  No results found for: \"VITD\", \"QVITD\", \"XNKD55OV\"    Current Outpatient Medications on File Prior to Visit   Medication Sig Dispense Refill    semaglutide-weight management (WEGOVY) 2.4 MG/0.75ML Subcutaneous Solution Auto-injector INJECT 0.75 ML (2.4 MG TOTAL) ONCE A WEEK AS DIRECTED 9 mL 0    PHENTERMINE HCL 37.5 MG Oral Tab TAKE 1 TABLET(37.5 MG) BY MOUTH EVERY MORNING BEFORE BREAKFAST 30 tablet 0    ibuprofen 600 MG Oral Tab       Insulin Pen Needle (PEN NEEDLES) 32G X 4 MM Does not apply Misc Inject 1 each into the skin daily. 100 each 1    cyclobenzaprine 5 MG Oral Tab Take 1 tablet (5 mg total) by mouth 3 (three) times daily as needed.      levonorgestrel 20 MCG/24HR Intrauterine IUD 20 mcg (1 each total) by Intrauterine route once.       No current facility-administered medications on file prior to visit.       ASSESSMENT/PLAN    ICD-10-CM    1. Therapeutic drug monitoring  Z51.81       2. Obesity, morbid, BMI 50 or higher (HCC)  E66.01       3. PCOS (polycystic ovarian syndrome)  E28.2       4. Prediabetes  R73.03       5. Other sleep apnea  G47.39           PLAN   Initial Weight Data and Goal Weight Loss:  Initial consult: #383 lbs on 11/2022  Weight Calculations  Initial Weight: 383 lbs  Initial Weight Date: 11/01/22  Today's Weight: 320 lbs  5% Goal: 19.15  10% Goal: 38.3  Total Weight Loss: 63 lbs  Total weight loss: Down 0 lbs total, Net loss 63  lbs  Continue with medications: wegovy 2.4mg weekly   Continue with medications: phentermine 37.5mg   Continue with medication: multivitamin   --advised of side effects and adverse effects of this medication  Contradictions: none  Reviewed labs  PCOS, has done metformin in the past   Snoring, +bairon but doesn't want to use machine (encouraged to maybe get a new one done)   Hx of lap band in 2010, stable   Prediabetes- last a1c was 5.7% on 11/2022  Stress, discussed ways to help reduce this  Wrote out new macros and encouraged tracking food   Nutrition: Low carb diet, recommended to eat breakfast daily/ regular protein intake  Follow up with dietitian and psychologist as recommended.  Discussed the role of sleep and stress in weight management.  Counseled on comprehensive weight loss plan including attention to nutrition, exercise and behavior/stress management for success. See patient instruction below for more details.  Discussed strategies to overcome barriers to successful weight loss and weight maintenance  FITTE: ACSM recommendations (150-300 minutes/ week in active weight loss)   Weight Loss Consent to treat reviewed and signed.    Total time spent on chart review, pre-charting, obtaining history, counseling, and educating, reviewing labs was 30 minutes.       NOTE TO PATIENT: The 21st Century Cures Act makes clinical notes like these available to patients in the interest of transparency. Clinical notes are medical documents used by physicians and care providers to communicate with each other. These documents include medical language and terminology, abbreviations, and treatment information that may sound technical and at times possibly unfamiliar. In addition, at times, the verbiage may appear blunt or direct. These documents are one tool providers use to communicate relevant information and clinical opinions of the care providers in a way that allows common understanding of the clinical context.     There are  no Patient Instructions on file for this visit.    No follow-ups on file.    Patient verbalizes understanding.    Ana Rosa Reyes, APRN

## 2024-04-11 NOTE — PATIENT INSTRUCTIONS
Next steps:  1.  Fill your prescribed medication and take as discussed and prescribed: wegovy 2.4mg weekly and phentermine  2.  Schedule a personal nutrition consultation with one of our registered dieticians     Please try to work on the following dietary changes:  Daily protein recommendation to start:  grams  Daily carbohydrate: <170g  Daily calories: 2,000  1.  Drink water with meals and throughout the day, cut down on soda and/or juice if consumed. Consider flavored water options like Bubbly, Spindrift, Hint and Rey.  2.  Eat breakfast daily and focus on having protein with each meal, examples include: greek yogurt, cottage cheese, hard boiled egg, whole grain toast with peanut butter.   3.  Reduce refined carbohydrates and sugars which includes items such as sweets, as well as rice, pasta, and bread and make sure to choose whole grain options when having them with just 1 serving per meal about the size of your inner palm.  4.  Consume non starchy veggies daily working towards making them a good 50% of your daily food intake. Add them to lunch and dinner consistently.  5.  Start a daily probiotic: VSL#3 is recommended, (order on line at www.vsl3.com). Take 1 capsule daily with water for 30 days, then reduce to 1 every other day (this will reduce the cost). Capsules can be left out for 2 weeks, but then must be refrigerated.      Please download venus My Fitness Pal, LoseIt! Or Net Diary to monitor daily dietary intake and you will be able to see if you are eating the right amount of calories or too much or too little which would hinder weight loss. Additionally this will help to see your daily carbohydrate and protein intake. When you set the venus up choose 1-2 lbs/week as a goal.  Keeping a paper food journal is an option as well to remain accountable for your choices- this is the start to mindful eating! A low calorie diet has been consistently shown to support weight loss.     Continue or start  exercising to help establish a routine. If not already exercising begin with 1 day and progress as able with long-term goal of 30 minutes 5 days a week at a minimum.     Meditation daily can help manage and control stress. Chronic stress can make weight loss difficult.  Exercising is one way to help with stress, but meditation using the CALM Colette or another comparable alternative can be done in your home or place of work with little time commitment. This Colette can also help work on behavior change and improve sleep. Check out the segment under Calm Masterclass and listen to The 4 Pillars of Health. A great way to begin learning about the foundation of lifestyle with practical tips to use in your every day.     Check out www.yourweightmatters.org blog for continued daily support and education along this weight loss journey!    Patient Resources:     Personal Training/Fitness Classes/Health Coaching     [facility] @ [email or web address]-center Full fitness center with group fitness and personal training. Discount available as client of [facility].  Health Coaching and Personal Training with Ninfa Del Angel at our [facility]- individual weekly coaching with option to add personal training and small group fitness classes targeted at weight loss- [phone] and/or email @ [email or web address]  360FIT [place] [email or web address]. Group Fitness [phone] and/or email Talia at [email or web address]  FrancNew[facility] Hospitaled Fitness Centers with multiple locations: Open Places (www.Paperlit), Eat The Frog Fitness (www.FwdHealth.TagSeats), Fit Body Bootcamp (www.emploi.usbodybootDaily Dealyp.TagSeats), KienVe Fitness (www.JackBe.TagSeats), The Exercise  (www.exercisecoach.TagSeats)     Online Fitness  Fitness  on [employer]  Fit in 10 DVD series- www.qszji83SSG.com  Sit and Be Fit - Chair exercise series  Www.sitandbefit.org  Hip Hop Fit with Israel Fautsin at www.hiphopfit.net     Apps for on the Go Fitness  byUs.com 7 Minute Workout (orange box with white 7) - free on the go HIIT training colette  Peloton Colette @ www.onepeloton.com     Nutrition Trackers and Tools  LoseIT! And My Fitness Pal apps and on line for tracking nutrition  NOOM - virtual health coaching  FitFoundation (healthy meals on the go) in [place] @ www.pkadgnbdcogpu0hLongfan Media  Cynthia RODRIGUEZ @ wwwRooftop Mediabistromd.com and Jvyyjk76 (keto and low carb plans recommended) @ www.jdqgzd62.com, Metabolic Meals @ www.MyMetabolicMeals.com - individual prepared meals to go  Gobble, Blue Apron, Home , Every Plate, Sunbasket- on line meal delivery programs for preparation at home  [place] in [place] for homemade meals to go @ www.mealIncentive Logicage.Relaborate  Diet Doctor @ www.dietdoctor.Relaborate - low carb swaps  Yummly - meal prep and planning colette (www.yummly.com)     Stress Management/Behavior/Mindful Eating  CALM meditation colette (www.calm.com)  Headspace  Am I Hungry? Mindful eating virtual  colette  Www.yourweightmatters.org - Obesity Action Coalition sponsored Blog posts daily  Motivation colette (black box with white \")- daily supportive messages sent to your phone     Books/Video Education/Podcasts  Mindless Eating by Jim Taylor  Why We Get Sick by Michael Figueroa (a book about insulin resistance)  Atomic Habits by Chuck Underwood (a book about taking small steps to promote greater behavior change)   Can't Hurt Me by Luciano Ng (a book exploring the power of discipline in achieving your goals)  The End of Dieting: How to Live for Life by Dr. Eugene Killian M.D. or listen to The [employer] Podcast Episode 63: Understanding \"Nutritarian\" Eating w/Dr. Eugene Killian  Your Body in Balance: The New Science of Food, Hormones, and Health by Dr. Theodore Abreu  The Menopause Diet Plan by Lauren Greer and Krysta Hilario  The Complete Guide to fasting by Dr. Mora  Sugar, Salt & Fat by Nicole  Willian, Ph.D, R.D.  Weight Loss Surgery Will Not Treat Food Addiction by Mickie Apple Ph.D  The Game Changers- MyCityWayix Documentary on plant based nutrition  Fed Up - documentary about obesity (Free on Utube)  The Truth About Sugar - documentary on sugar (Free on Utube, https://youtu.be/5F4uyruWF0m)  The Dr. Varma T5 Wellness Plan by Dr. Freddy Varma MD  Fitlosophy Fitspiration - journal to better health (found at [employer] in fitness aisle)  What Happened to You?- a look at the impact trauma has on behavior written by Francia Patel and Dr. Topher Barragan  Whole Again by Tobin Duckworth - discovering your true self after trauma  Gabby Flores talk on [employer], The Call to Courage  Podcasts: The Exam Room by the Physician's Committee, Nutrition Facts by Dr. He    We are here to support you with weight loss, but please remember that you still need your primary care provider for your routine health maintenance.

## 2024-05-02 DIAGNOSIS — Z51.81 THERAPEUTIC DRUG MONITORING: ICD-10-CM

## 2024-05-02 DIAGNOSIS — E66.01 OBESITY, MORBID, BMI 50 OR HIGHER (HCC): ICD-10-CM

## 2024-05-03 ENCOUNTER — PATIENT MESSAGE (OUTPATIENT)
Dept: INTERNAL MEDICINE CLINIC | Facility: CLINIC | Age: 54
End: 2024-05-03

## 2024-05-03 RX ORDER — PHENTERMINE HYDROCHLORIDE 37.5 MG/1
37.5 TABLET ORAL
Qty: 30 TABLET | Refills: 0 | OUTPATIENT
Start: 2024-05-03

## 2024-05-03 NOTE — TELEPHONE ENCOUNTER
From: Kathie Rothman  To: Ana Rosa Reyes  Sent: 5/3/2024 9:21 AM CDT  Subject: Prescription refill     I’m sorry but Guero messed up but it’s corrected now and everything is ok … sorry about the confusion

## 2024-06-12 ENCOUNTER — HOSPITAL ENCOUNTER (OUTPATIENT)
Age: 54
Discharge: HOME | End: 2024-06-12
Attending: EMERGENCY MEDICINE
Payer: COMMERCIAL

## 2024-06-12 VITALS
SYSTOLIC BLOOD PRESSURE: 155 MMHG | TEMPERATURE: 101 F | BODY MASS INDEX: 44.41 KG/M2 | DIASTOLIC BLOOD PRESSURE: 87 MMHG | WEIGHT: 293 LBS | RESPIRATION RATE: 18 BRPM | HEART RATE: 126 BPM | HEIGHT: 68 IN | OXYGEN SATURATION: 97 %

## 2024-06-12 DIAGNOSIS — U07.1 COVID-19: Primary | ICD-10-CM

## 2024-06-12 LAB
POCT INFLUENZA A: NEGATIVE
POCT INFLUENZA B: NEGATIVE
SARS-COV-2 RNA RESP QL NAA+PROBE: DETECTED

## 2024-06-12 PROCEDURE — 99213 OFFICE O/P EST LOW 20 MIN: CPT

## 2024-06-12 PROCEDURE — 99203 OFFICE O/P NEW LOW 30 MIN: CPT

## 2024-06-12 PROCEDURE — 87502 INFLUENZA DNA AMP PROBE: CPT | Performed by: EMERGENCY MEDICINE

## 2024-06-12 RX ORDER — IBUPROFEN 600 MG/1
600 TABLET ORAL ONCE
Status: COMPLETED | OUTPATIENT
Start: 2024-06-12 | End: 2024-06-12

## 2024-06-12 NOTE — ED PROVIDER NOTES
Patient Seen in: [facility]      History     Chief Complaint   Patient presents with    Cough/URI    Body ache and/or chills    Sore Throat     Stated Complaint: cough/URI    Subjective:   HPI    53-year-old female presents to the immediate care for evaluation of a 24-hour history of fevers as high as 100.6 associated with nasal congestion, scratchy throat and nonproductive cough.  No chest pain or difficulty breathing.  There have been other coworkers who have recently been ill.  Patient is employed in an office setting.  She is taking food and fluids well.    Objective:   History reviewed. No pertinent past medical history.           Past Surgical History:   Procedure Laterality Date    Hysteroscopy      Lap adjustable gastric band      Laparoscopic cholecystectomy      Umbilical hernia repair                  Social History     Socioeconomic History    Marital status:    Tobacco Use    Smoking status: Never    Smokeless tobacco: Never   Substance and Sexual Activity    Alcohol use: Yes     Alcohol/week: 0.0 standard drinks of alcohol     Comment: SOCIALLY    Drug use: No    Sexual activity: Yes     Birth control/protection: I.U.D.              Review of Systems    Positive for stated complaint: cough/URI  Other systems are as noted in HPI.  Constitutional and vital signs reviewed.      All other systems reviewed and negative except as noted above.    Physical Exam     ED Triage Vitals [06/12/24 1553]   /87   Pulse (!) 126   Resp 18   Temp (!) 100.6 °F (38.1 °C)   Temp src    SpO2 97 %   O2 Device None (Room air)       Current Vitals:   Vital Signs  BP: 155/87  Pulse: 0 (md aware of vitals - ok to d/c)  Resp: 18  Temp: (!) 100.6 °F (38.1 °C)    Oxygen Therapy  SpO2: 97 %  O2 Device: None (Room air)            Physical Exam    General appearance: This is a middle-aged female sitting on a gurney.  Vital signs were reviewed per nurses notes.  Temperature was 102.6.  Heart rate is in the  120s.  Blood pressure was within normal range.  Pulse oximetry is 97% on room air.  HEENT: Normocephalic atraumatic.  Anicteric sclera.  Oral mucosa is moist.  Oropharynx is normal.  Neck: No adenopathy or thyromegaly.  Lungs are clear to auscultation.  Heart exam: Normal S1-S2 without extra sounds or murmurs.  Regular rate and rhythm.  Skin is dry without rashes or lesions.  Neuroexam: Awake, conversive and moving all 4 extremities well.    ED Course     Labs Reviewed   RAPID SARS-COV-2 BY PCR - Abnormal; Notable for the following components:       Result Value    Rapid SARS-CoV-2 by PCR Detected (*)     All other components within normal limits   POCT FLU TEST - Normal    Narrative:     This assay is a rapid molecular in vitro test utilizing nucleic acid amplification of influenza A and B viral RNA.             Test results and treatment plan were discussed.         MDM      #1.  Acute febrile illness secondary to COVID-19.  Oxygen saturation is adequate.  Patient has risk factors including obesity and diabetes mellitus that merit antiviral treatment.  Because of some drug interactions with Paxlovid, molnupiravir was prescribed.  Criteria for ED visit were discussed.                                       Medical Decision Making      Disposition and Plan     Clinical Impression:  1. COVID-19         Disposition:  Discharge  6/12/2024  4:15 pm    Follow-up:  Renee Fernandez MD  [address] [address]  [phone]    Call   As needed          Medications Prescribed:  Discharge Medication List as of 6/12/2024  4:18 PM        START taking these medications    Details   molnupiravir 200 MG Oral capsule Take 800 mg by mouth every 12 (twelve) hours for 5 days., Normal, Disp-40 capsule, R-0

## 2024-06-19 DIAGNOSIS — E66.01 OBESITY, MORBID, BMI 50 OR HIGHER (HCC): ICD-10-CM

## 2024-06-19 RX ORDER — SEMAGLUTIDE 2.4 MG/.75ML
INJECTION, SOLUTION SUBCUTANEOUS
Qty: 9 ML | Refills: 0 | Status: SHIPPED | OUTPATIENT
Start: 2024-06-19

## 2024-06-19 NOTE — TELEPHONE ENCOUNTER
Requesting   Requested Prescriptions     Pending Prescriptions Disp Refills    WEGOVY 2.4 MG/0.75ML Subcutaneous Solution Auto-injector [Pharmacy Med Name: WEGOVY PEN INJ 0.75ML 4'S 2.4MG] 9 mL 3     Sig: INJECT 0.75 ML (2.4 MG TOTAL) ONCE A WEEK AS DIRECTED       LOV: 04/11/2024  RTC: 3 months  Filled: 04/02/2024 #9ml with 0 refills    Future Appointments   Date Time Provider Department Center   8/29/2024 11:00 AM Ana Rosa Reyes APRN [facility] [id number]   8/30/2024 10:20 AM Ruby Fuller MD [facility]

## 2024-08-07 DIAGNOSIS — Z51.81 THERAPEUTIC DRUG MONITORING: ICD-10-CM

## 2024-08-07 DIAGNOSIS — E66.01 OBESITY, MORBID, BMI 50 OR HIGHER (HCC): ICD-10-CM

## 2024-08-07 RX ORDER — PHENTERMINE HYDROCHLORIDE 37.5 MG/1
37.5 TABLET ORAL
Qty: 30 TABLET | Refills: 0 | Status: SHIPPED | OUTPATIENT
Start: 2024-08-07

## 2024-08-07 NOTE — TELEPHONE ENCOUNTER
Requesting   Requested Prescriptions     Pending Prescriptions Disp Refills    PHENTERMINE HCL 37.5 MG Oral Tab [Pharmacy Med Name: PHENTERMINE 37.5MG TABLETS] 30 tablet 0     Sig: TAKE 1 TABLET(37.5 MG) BY MOUTH BEFORE BREAKFAST       LOV: 04/11/2024  RTC: in about 3 months  Filled: 04/29/2024 #30 with 2 refills    Future Appointments   Date Time Provider Department Center   8/29/2024 11:00 AM Ana Rosa Reyes APRN [facility] [id number]   8/30/2024 10:20 AM Ruby Fuller MD [facility]

## 2024-08-29 ENCOUNTER — OFFICE VISIT (OUTPATIENT)
Dept: INTERNAL MEDICINE CLINIC | Facility: CLINIC | Age: 54
End: 2024-08-29
Payer: COMMERCIAL

## 2024-08-29 VITALS
HEIGHT: 68 IN | HEART RATE: 88 BPM | WEIGHT: 293 LBS | RESPIRATION RATE: 16 BRPM | BODY MASS INDEX: 44.41 KG/M2 | DIASTOLIC BLOOD PRESSURE: 80 MMHG | SYSTOLIC BLOOD PRESSURE: 124 MMHG

## 2024-08-29 DIAGNOSIS — E28.2 PCOS (POLYCYSTIC OVARIAN SYNDROME): ICD-10-CM

## 2024-08-29 DIAGNOSIS — Z98.84 HISTORY OF LAPAROSCOPIC ADJUSTABLE GASTRIC BANDING: ICD-10-CM

## 2024-08-29 DIAGNOSIS — G47.39 OTHER SLEEP APNEA: ICD-10-CM

## 2024-08-29 DIAGNOSIS — R73.03 PREDIABETES: ICD-10-CM

## 2024-08-29 DIAGNOSIS — E66.01 OBESITY, MORBID, BMI 50 OR HIGHER (HCC): ICD-10-CM

## 2024-08-29 DIAGNOSIS — Z51.81 THERAPEUTIC DRUG MONITORING: Primary | ICD-10-CM

## 2024-08-29 PROCEDURE — 99213 OFFICE O/P EST LOW 20 MIN: CPT | Performed by: NURSE PRACTITIONER

## 2024-08-29 RX ORDER — PHENTERMINE HYDROCHLORIDE 37.5 MG/1
37.5 TABLET ORAL
Qty: 30 TABLET | Refills: 3 | Status: SHIPPED | OUTPATIENT
Start: 2024-08-29

## 2024-08-29 NOTE — PATIENT INSTRUCTIONS
Next steps:  1.  Fill your prescribed medication and take as discussed and prescribed: wegovy 2.4mg weekly and phentermine 37.5mg   2.  Schedule a personal nutrition consultation with one of our registered dieticians     Please try to work on the following dietary changes:  Daily protein recommendation to start:  grams  Daily carbohydrate: <165g  Daily calories: 2,000  1.  Drink water with meals and throughout the day, cut down on soda and/or juice if consumed. Consider flavored water options like Bubbly, Spindrift, Hint and Rey.  2.  Eat breakfast daily and focus on having protein with each meal, examples include: greek yogurt, cottage cheese, hard boiled egg, whole grain toast with peanut butter.   3.  Reduce refined carbohydrates and sugars which includes items such as sweets, as well as rice, pasta, and bread and make sure to choose whole grain options when having them with just 1 serving per meal about the size of your inner palm.  4.  Consume non starchy veggies daily working towards making them a good 50% of your daily food intake. Add them to lunch and dinner consistently.  5.  Start a daily probiotic: VSL#3 is recommended, (order on line at www.vsl3.com). Take 1 capsule daily with water for 30 days, then reduce to 1 every other day (this will reduce the cost). Capsules can be left out for 2 weeks, but then must be refrigerated.      Please download venus My Fitness Pal, LoseIt! Or Net Diary to monitor daily dietary intake and you will be able to see if you are eating the right amount of calories or too much or too little which would hinder weight loss. Additionally this will help to see your daily carbohydrate and protein intake. When you set the venus up choose 1-2 lbs/week as a goal.  Keeping a paper food journal is an option as well to remain accountable for your choices- this is the start to mindful eating! A low calorie diet has been consistently shown to support weight loss.     Continue or  start exercising to help establish a routine. If not already exercising begin with 1 day and progress as able with long-term goal of 30 minutes 5 days a week at a minimum.     Meditation daily can help manage and control stress. Chronic stress can make weight loss difficult.  Exercising is one way to help with stress, but meditation using the CALM Colette or another comparable alternative can be done in your home or place of work with little time commitment. This Colette can also help work on behavior change and improve sleep. Check out the segment under Calm Masterclass and listen to The 4 Pillars of Health. A great way to begin learning about the foundation of lifestyle with practical tips to use in your every day.     Check out www.yourweightmatters.org blog for continued daily support and education along this weight loss journey!    Patient Resources:     Personal Training/Fitness Classes/Health Coaching     [facility] @ [email or web address]/healthy-driven/[email or web address]-center Full fitness center with group fitness and personal training. Discount available as client of [facility].  Health Coaching and Personal Training with Ninfa Del Angel at our [facility]- individual weekly coaching with option to add personal training and small group fitness classes targeted at weight loss- [phone] and/or email @ [email or web address]  360FIT [place] [email or web address]. Group Fitness [phone] and/or email Talia at [email or web address]  [facility] Fitness Centers with multiple locations: Interwise (www.3FLOZ), Eat The Frog Fitness (www.Sentropi.Produce Run), Fit Body Bootcamp (www.SquadMailbodybootLattice Incorporatedp.Produce Run), Proxim Wireless Fitness (www.Lotsa Helping Hands.Produce Run), The Exercise  (www.exercisecoach.Produce Run)     Online Fitness  Fitness  on Jobspotting  Fit in 10 DVD series- www.rslin67EMC.com  Sit and Be Fit - Chair exercise series  Www.sitandbefit.org  Hip Hop Fit with Israel Faustin at www.hiphopfit.net     Apps for on the Go Fitness  Sinobpo 7 Minute Workout (orange box with white 7) - free on the go HIIT training colette  Peloton Colette @ www.onepeloton.com     Nutrition Trackers and Tools  LoseIT! And My Fitness Pal apps and on line for tracking nutrition  NOOM - virtual health coaching  FitFoundation (healthy meals on the go) in [place] @ www.mqxmyoaznbryh8iZonare Medical Systems  Cynthia RODRIGUEZ @ wwwThe Ivory Companybistromd.com and Ejgkfc14 (keto and low carb plans recommended) @ www.lysyhy65.com, Metabolic Meals @ www.MyMetabolicMeals.com - individual prepared meals to go  Gobble, Blue Apron, Home , Every Plate, Sunbasket- on line meal delivery programs for preparation at home  Meal Village in [place] for homemade meals to go @ www.mealZoomoramaage.vitalclip  Diet Doctor @ www.dietdoctor.vitalclip - low carb swaps  Yummly - meal prep and planning colette (www.yummly.com)     Stress Management/Behavior/Mindful Eating  CALM meditation colette (www.calm.com)  Headspace  Am I Hungry? Mindful eating virtual  colette  Www.yourweightmatters.org - Obesity Action Coalition sponsored Blog posts daily  Motivation colette (black box with white \")- daily supportive messages sent to your phone     Books/Video Education/Podcasts  Mindless Eating by Jim Taylor  Why We Get Sick by Michael Figueroa (a book about insulin resistance)  Atomic Habits by Chuck Underwood (a book about taking small steps to promote greater behavior change)   Can't Hurt Me by Luciano Ng (a book exploring the power of discipline in achieving your goals)  The End of Dieting: How to Live for Life by Dr. Eugene Killian M.D. or listen to The Tissue Genesis Podcast Episode 63: Understanding \"Nutritarian\" Eating w/Dr. Eugene Killian  Your Body in Balance: The New Science of Food, Hormones, and Health by Dr. Theodore Abreu  The Menopause Diet Plan by Lauren Greer and Krysta Hilario  The Complete Guide to fasting by Dr. Mora  Sugar, Salt & Fat by Nicole  Willian, Ph.D, R.D.  Weight Loss Surgery Will Not Treat Food Addiction by Mickie Apple Ph.D  The Game Changers- Sleepy'six Documentary on plant based nutrition  Fed Up - documentary about obesity (Free on Utube)  The Truth About Sugar - documentary on sugar (Free on Utube, https://youtu.be/8X4iiqyGU0a)  The Dr. Varma T5 Wellness Plan by Dr. Freddy Varma MD  Fitlosophy Fitspiration - journal to better health (found at [employer] in fitness aisle)  What Happened to You?- a look at the impact trauma has on behavior written by Francia Patel and Dr. Topher Barragan  Whole Again by Tobin Duckworth - discovering your true self after trauma  Gabby Flores talk on EcoTimber, The Call to Courage  Podcasts: The Exam Room by the Physician's Committee, Nutrition Facts by Dr. He    We are here to support you with weight loss, but please remember that you still need your primary care provider for your routine health maintenance.

## 2024-08-29 NOTE — PROGRESS NOTES
HISTORY OF PRESENT ILLNESS  Chief Complaint   Patient presents with    Weight Check     -2     Kathie Rothman is a 53 year old female here for follow up with medical weight loss program for the treatment of overweight, obesity, or morbid obesity.     Down 2 lbs (f/u from 4/2024)   Compliant on wegovy 2.4mg weekly and phentermine 37.5mg     Tolerating well, helping with decreasing appetite and no side effects     Weight loss has slowed down   Is back to work (had summer off)  Is back to salads at lunch   Traveled on airplane and didn't have to use seat belt extender   Exercise/Activity: 2x/ week, via walking, free weights (3 days per week)   Nutrition: eating regular meals, +protein, minimal veggies. +interm.. tracking reports  Meals out per week on average: 1  Stress is manageable   Sleep: 7 hours/night, waking up feeling rested    Denies chest pain, shortness of breath, dizziness, blurred vision, headache, paresthesia, nausea/vomiting.     Breakfast Lunch Dinner Snacks Fluids   Reviewed              Wt Readings from Last 6 Encounters:   08/29/24 (!) 318 lb (144.2 kg)   06/12/24 (!) 317 lb (143.8 kg)   04/11/24 (!) 320 lb (145.2 kg)   12/07/23 (!) 320 lb (145.2 kg)   06/29/23 (!) 325 lb (147.4 kg)   06/09/23 (!) 328 lb (148.8 kg)          REVIEW OF SYSTEMS  GENERAL: feels well otherwise, denied any fevers chills or night sweats   LUNGS: denies shortness of breath  CARDIOVASCULAR: denies chest pain  GI: denies abdominal pain  MUSCULOSKELETAL: denies back pain, joint pains  PSYCH: denies change in behavior or mood, denies feeling sad or depressed    EXAM  /80   Pulse 88   Resp 16   Ht 5' 8\" (1.727 m)   Wt (!) 318 lb (144.2 kg)   LMP 08/20/2024 (Exact Date)   BMI 48.35 kg/m²       GENERAL: well developed, well nourished, in no apparent distress, A/O x3  SKIN: no rashes, no suspicious lesions  HEENT: atraumatic, normocephalic, OP-clear, PERRLA  NECK: supple, no adenopathy  LUNGS: CTA in all fields,  breathing non labored  CARDIO: RRR without murmur  GI: +BS, NT/ND, no masses or HSM  EXTREMITIES: no cyanosis, no clubbing, no edema    No results found for: \"GLU\", \"BUN\", \"BUNCREA\", \"CREATSERUM\", \"ANIONGAP\", \"GFR\", \"GFRNAA\", \"GFRAA\", \"CA\", \"OSMOCALC\", \"ALKPHO\", \"AST\", \"ALT\", \"ALKPHOS\", \"BILT\", \"TP\", \"ALB\", \"GLOBULIN\", \"AGRATIO\", \"NA\", \"K\", \"CL\", \"CO2\"  Lab Results   Component Value Date     11/11/2022    A1C 5.7 (H) 11/11/2022     No results found for: \"CHOLEST\", \"TRIG\", \"HDL\", \"LDL\", \"VLDL\", \"TCHDLRATIO\", \"NONHDLC\", \"CHOLHDLRATIO\", \"CALCNONHDL\"  No results found for: \"B12\", \"VITB12\"  No results found for: \"VITD\", \"QVITD\", \"JDDS24ED\"    Current Outpatient Medications on File Prior to Visit   Medication Sig Dispense Refill    PHENTERMINE HCL 37.5 MG Oral Tab TAKE 1 TABLET(37.5 MG) BY MOUTH BEFORE BREAKFAST 30 tablet 0    semaglutide-weight management (WEGOVY) 2.4 MG/0.75ML Subcutaneous Solution Auto-injector INJECT 0.75 ML (2.4 MG TOTAL) ONCE A WEEK AS DIRECTED 9 mL 0    cyclobenzaprine 5 MG Oral Tab Take 1 tablet (5 mg total) by mouth 3 (three) times daily as needed.       No current facility-administered medications on file prior to visit.       ASSESSMENT/PLAN    ICD-10-CM    1. Therapeutic drug monitoring  Z51.81       2. Obesity, morbid, BMI 50 or higher (HCC)  E66.01       3. PCOS (polycystic ovarian syndrome)  E28.2       4. Other sleep apnea  G47.39       5. Prediabetes  R73.03       6. History of laparoscopic adjustable gastric banding  Z98.84           PLAN   Initial Weight Data and Goal Weight Loss:  Initial consult: #383 lbs on 11/2022  Weight Calculations  Initial Weight: 383 lbs  Initial Weight Date: 11/01/22  Today's Weight: 318 lbs  5% Goal: 19.15  10% Goal: 38.3  Total Weight Loss: 65 lbs  Total weight loss: Down 2 lbs total, Net loss 65 lbs  Continue with medications: wegovy 2.4mg weekly   Continue with medications: phentermine 37.5mg   Continue with medication: multivitamin   --advised of  side effects and adverse effects of this medication  Contradictions: none  Reviewed labs (from outside [facility])   PCOS, has done metformin in the past   Snoring, +bairon but doesn't want to use machine  Hx of lap band in 2010, stable   Hx Prediabetes- last a1c was 5.2% on 8/15/2024- was previously 5.7% on 11/2022  Stress, has improved with son moving out of house  Wrote out new macros and encouraged tracking food   Nutrition: Low carb diet, recommended to eat breakfast daily/ regular protein intake  Follow up with dietitian and psychologist as recommended.  Discussed the role of sleep and stress in weight management.  Counseled on comprehensive weight loss plan including attention to nutrition, exercise and behavior/stress management for success. See patient instruction below for more details.  Discussed strategies to overcome barriers to successful weight loss and weight maintenance  FITTE: ACSM recommendations (150-300 minutes/ week in active weight loss)   Weight Loss Consent to treat reviewed and signed.    Total time spent on chart review, pre-charting, obtaining history, counseling, and educating, reviewing labs was 27 minutes.       NOTE TO PATIENT: The 21st Century Cures Act makes clinical notes like these available to patients in the interest of transparency. Clinical notes are medical documents used by physicians and care providers to communicate with each other. These documents include medical language and terminology, abbreviations, and treatment information that may sound technical and at times possibly unfamiliar. In addition, at times, the verbiage may appear blunt or direct. These documents are one tool providers use to communicate relevant information and clinical opinions of the care providers in a way that allows common understanding of the clinical context.     There are no Patient Instructions on file for this visit.    No follow-ups on file.    Patient verbalizes understanding.    Ana Rosa Hardin  TOAN Reyes

## 2024-08-30 ENCOUNTER — OFFICE VISIT (OUTPATIENT)
Dept: OBGYN CLINIC | Facility: CLINIC | Age: 54
End: 2024-08-30
Payer: COMMERCIAL

## 2024-08-30 VITALS
DIASTOLIC BLOOD PRESSURE: 88 MMHG | BODY MASS INDEX: 44.41 KG/M2 | HEART RATE: 83 BPM | HEIGHT: 68 IN | WEIGHT: 293 LBS | SYSTOLIC BLOOD PRESSURE: 138 MMHG

## 2024-08-30 DIAGNOSIS — Z01.419 ENCOUNTER FOR GYNECOLOGICAL EXAMINATION WITHOUT ABNORMAL FINDING: ICD-10-CM

## 2024-08-30 DIAGNOSIS — Z97.5 IUD (INTRAUTERINE DEVICE) IN PLACE: ICD-10-CM

## 2024-08-30 DIAGNOSIS — Z12.4 SCREENING FOR CERVICAL CANCER: Primary | ICD-10-CM

## 2024-08-30 PROCEDURE — 87624 HPV HI-RISK TYP POOLED RSLT: CPT | Performed by: OBSTETRICS & GYNECOLOGY

## 2024-09-01 NOTE — PROGRESS NOTES
Kathie Rothman is a 53 year old female  Patient's last menstrual period was 2024 (exact date). who presents for   Chief Complaint   Patient presents with    Gyn Exam     Annual // Discuss menopause symptoms per patient      Has IUD in place since 2017 for abnormal uterine bleeding.  We discussed that the IUD should be removed.  Her hormone levels were in the menopausal range last year.      OBSTETRICS HISTORY:  OB History    Para Term  AB Living   2 2 2 0 0 2   SAB IAB Ectopic Multiple Live Births   0 0 0 0 2       GYNE HISTORY:        MEDICAL HISTORY:  History reviewed. No pertinent past medical history.    SURGICAL HISTORY:  Past Surgical History:   Procedure Laterality Date    Hysteroscopy      Lap adjustable gastric band      Laparoscopic cholecystectomy      Umbilical hernia repair         SOCIAL HISTORY:  Social History     Socioeconomic History    Marital status:    Tobacco Use    Smoking status: Never     Passive exposure: Never    Smokeless tobacco: Never   Substance and Sexual Activity    Alcohol use: Yes     Comment: Occasional    Drug use: No    Sexual activity: Yes     Birth control/protection: I.U.D.         FAMILY HISTORY:  History reviewed. No pertinent family history.    MEDICATIONS:    Current Outpatient Medications:     Phentermine HCl 37.5 MG Oral Tab, Take 1 tablet (37.5 mg total) by mouth before breakfast., Disp: 30 tablet, Rfl: 3    semaglutide-weight management (WEGOVY) 2.4 MG/0.75ML Subcutaneous Solution Auto-injector, INJECT 0.75 ML (2.4 MG TOTAL) ONCE A WEEK AS DIRECTED, Disp: 9 mL, Rfl: 0    cyclobenzaprine 5 MG Oral Tab, Take 1 tablet (5 mg total) by mouth 3 (three) times daily as needed., Disp: , Rfl:     ALLERGIES:  No Known Allergies      Review of Systems:  Constitutional:  Denies fatigue, night sweats, hot flashes  Eyes:  denies blurred or double vision  Cardiovascular:  denies chest pain or palpitations  Respiratory:  denies shortness of  breath  Gastrointestinal:  denies heartburn, abdominal pain, diarrhea or constipation  Genitourinary:  denies dysuria, incontinence, abnormal vaginal discharge, vaginal itching  Musculoskeletal:  denies back pain.  Skin/Breast:  Denies any breast pain, lumps, or discharge.   Neurological:  denies headaches, extremity weakness or numbness.  Psychiatric: denies depression or anxiety.  Endocrine:   denies excessive thirst or urination.  Heme/Lymph:  denies history of anemia, easy bruising or bleeding.    Depression Screening (PHQ-2/PHQ-9): Over the LAST 2 WEEKS   Little interest or pleasure in doing things: Not at all    Feeling down, depressed, or hopeless: Not at all    PHQ-2 SCORE: 0         Blood pressure 138/88, pulse 83, height 5' 8\" (1.727 m), weight (!) 319 lb (144.7 kg), last menstrual period 08/20/2024.    PHYSICAL EXAM:   Constitutional: well developed, well nourished  Head/Face: normocephalic  Neck/Thyroid: thyroid symmetric, no thyromegaly, no nodules, no adenopathy  Lymphatic:no abnormal supraclavicular or axillary adenopathy is noted  Breast: normal without palpable masses, tenderness, asymmetry, nipple discharge, nipple retraction or skin changes  Respiratory:  lungs clear to auscultation bilaterally  Cardiovascular: regular rate and rhythm, no significant murmur  Abdomen:  soft, nontender, nondistended, no masses  Skin/Hair: no unusual rashes or bruises  Extremities: no edema, no cyanosis  Psychiatric:  Oriented to time, place, person and situation. Appropriate mood and affect    Pelvic Exam:  External Genitalia: normal appearance, hair distribution, and no lesions  Urethral Meatus:  normal in size, location, without lesions and prolapse  Bladder:  No fullness, masses or tenderness  Vagina:  Normal appearance without lesions, no abnormal discharge  Cervix:  Normal without tenderness on motion  Uterus: normal in size, contour, position, mobility, without tenderness  Adnexa: normal without masses or  tenderness  Perineum: normal  Rectovaginal: no masses, normal tone  Anus: no thrombosed or ulcerated hemorroids    Assessment & Plan:   ASCCP guidelines discussed,cotest done,mammogram ordered,rtc 1 year for annual exam   SBE encouraged  Rtc for mirena IUD removal  Encounter Diagnoses   Name Primary?    Encounter for gynecological examination without abnormal finding     IUD (intrauterine device) in place     Screening for cervical cancer Yes     Orders Placed This Encounter   Procedures    Hpv Dna  High Risk , Thin Prep Collect    ThinPrep PAP Smear    THINPREP PAP SMEAR ONLY     Requested Prescriptions      No prescriptions requested or ordered in this encounter     None

## 2024-09-03 LAB — HPV E6+E7 MRNA CVX QL NAA+PROBE: NEGATIVE

## 2024-09-08 DIAGNOSIS — E66.01 OBESITY, MORBID, BMI 50 OR HIGHER (HCC): ICD-10-CM

## 2024-09-10 RX ORDER — SEMAGLUTIDE 2.4 MG/.75ML
INJECTION, SOLUTION SUBCUTANEOUS
Qty: 9 ML | Refills: 1 | Status: SHIPPED | OUTPATIENT
Start: 2024-09-10

## 2024-09-10 NOTE — TELEPHONE ENCOUNTER
Requesting   Requested Prescriptions     Pending Prescriptions Disp Refills    WEGOVY 2.4 MG/0.75ML Subcutaneous Solution Auto-injector [Pharmacy Med Name: WEGOVY PEN INJ 0.75ML 4'S 2.4MG] 9 mL 3     Sig: INJECT 0.75 ML (2.4 MG TOTAL) ONCE A WEEK AS DIRECTED       LOV: 08/29/2024  RTC: in about 3 months  Filled: 06/19/2024 #9ml with 0 refills    Future Appointments   Date Time Provider Department Center   10/31/2024 11:20 AM Ruby Fuller MD [facility]   1/23/2025 11:00 AM Ana Rosa Reyes APRN [facility] [id number]   I am not sure about this, please advise.

## 2024-10-31 ENCOUNTER — OFFICE VISIT (OUTPATIENT)
Dept: OBGYN CLINIC | Facility: CLINIC | Age: 54
End: 2024-10-31
Payer: COMMERCIAL

## 2024-10-31 VITALS
DIASTOLIC BLOOD PRESSURE: 81 MMHG | SYSTOLIC BLOOD PRESSURE: 117 MMHG | WEIGHT: 293 LBS | HEART RATE: 76 BPM | BODY MASS INDEX: 47 KG/M2

## 2024-10-31 DIAGNOSIS — Z30.432 ENCOUNTER FOR REMOVAL OF INTRAUTERINE CONTRACEPTIVE DEVICE: ICD-10-CM

## 2024-10-31 DIAGNOSIS — Z30.46 ENCOUNTER FOR REMOVAL OF SUBDERMAL CONTRACEPTIVE IMPLANT: Primary | ICD-10-CM

## 2024-10-31 PROCEDURE — 3079F DIAST BP 80-89 MM HG: CPT | Performed by: OBSTETRICS & GYNECOLOGY

## 2024-10-31 PROCEDURE — 3074F SYST BP LT 130 MM HG: CPT | Performed by: OBSTETRICS & GYNECOLOGY

## 2024-10-31 PROCEDURE — 58301 REMOVE INTRAUTERINE DEVICE: CPT | Performed by: OBSTETRICS & GYNECOLOGY

## 2024-10-31 NOTE — PROCEDURES
IUD Removal     Pregnancy Results: negative from  n/a  test   Consent signed.  Procedure discussed with the patient in detail including indication, risks, benefits, alternatives and complications.    Pelvic Exam Findings:      Procedure:  Speculum placed in the vagina.  Betadine wash of vagina and cervix.  A Ring Forceps was used to grasp IUD strings.  Mirena IUD was removed without difficulty.  The patient tolerated the procedure well.      Visit Plan:  Discharge instructions were reviewed with the patient.

## 2024-11-25 ENCOUNTER — PATIENT MESSAGE (OUTPATIENT)
Dept: INTERNAL MEDICINE CLINIC | Facility: CLINIC | Age: 54
End: 2024-11-25

## 2024-11-26 NOTE — TELEPHONE ENCOUNTER
Requesting wegovy 2.4 mg  LOV: 8/29/24  RTC: 3 months  Last Relevant Labs: na  Filled: 9/10/24 #9ml with 1 refills    Future Appointments   Date Time Provider Department Center   1/23/2025 11:00 AM Ana Rosa Reyes APRN [facility] [id number]   8/7/2025 11:00 AM Ruby Fuller MD [facility]

## 2024-12-02 NOTE — TELEPHONE ENCOUNTER
Approved    Prior authorization approved  Payer: [facility] Case ID: [id number]    [phone]    [phone]  Note from payer: CaseId:[id number];Status:Approved;Review Type:Prior Auth;Coverage Start Date:11/02/2024;Coverage End Date:12/02/2025;  Approval Details    Authorized from November 2, 2024 to December 2, 2025  Electronic appeal: Not supported  View History  Medication Being Authorized    semaglutide-weight management (WEGOVY) 2.4 MG/0.75ML Subcutaneous Solution Auto-injector  INJECT 0.75 ML (2.4 MG TOTAL) ONCE A WEEK AS DIRECTED  Dispense: 9 mL Refills: 1   Start: 9/10/2024   Class: Normal Diagnoses: Obesity, morbid, BMI 50 or higher (HCC)   This order has been released to its destination.  To be filled at: [facility] - [address] [phone], [phone]

## 2025-01-23 ENCOUNTER — OFFICE VISIT (OUTPATIENT)
Dept: INTERNAL MEDICINE CLINIC | Facility: CLINIC | Age: 55
End: 2025-01-23
Payer: COMMERCIAL

## 2025-01-23 VITALS
WEIGHT: 293 LBS | SYSTOLIC BLOOD PRESSURE: 120 MMHG | BODY MASS INDEX: 44.41 KG/M2 | HEART RATE: 88 BPM | DIASTOLIC BLOOD PRESSURE: 70 MMHG | HEIGHT: 68 IN | RESPIRATION RATE: 16 BRPM

## 2025-01-23 DIAGNOSIS — Z51.81 THERAPEUTIC DRUG MONITORING: Primary | ICD-10-CM

## 2025-01-23 DIAGNOSIS — Z98.84 HISTORY OF LAPAROSCOPIC ADJUSTABLE GASTRIC BANDING: ICD-10-CM

## 2025-01-23 DIAGNOSIS — E28.2 PCOS (POLYCYSTIC OVARIAN SYNDROME): ICD-10-CM

## 2025-01-23 DIAGNOSIS — G47.39 OTHER SLEEP APNEA: ICD-10-CM

## 2025-01-23 DIAGNOSIS — R73.03 PREDIABETES: ICD-10-CM

## 2025-01-23 DIAGNOSIS — E66.01 OBESITY, MORBID, BMI 50 OR HIGHER (HCC): ICD-10-CM

## 2025-01-23 PROCEDURE — 3078F DIAST BP <80 MM HG: CPT | Performed by: NURSE PRACTITIONER

## 2025-01-23 PROCEDURE — 99214 OFFICE O/P EST MOD 30 MIN: CPT | Performed by: NURSE PRACTITIONER

## 2025-01-23 PROCEDURE — 3008F BODY MASS INDEX DOCD: CPT | Performed by: NURSE PRACTITIONER

## 2025-01-23 PROCEDURE — 3074F SYST BP LT 130 MM HG: CPT | Performed by: NURSE PRACTITIONER

## 2025-01-23 RX ORDER — SEMAGLUTIDE 2.4 MG/.75ML
2.4 INJECTION, SOLUTION SUBCUTANEOUS WEEKLY
Qty: 9 ML | Refills: 0 | Status: SHIPPED | OUTPATIENT
Start: 2025-01-23

## 2025-01-23 RX ORDER — PHENTERMINE HYDROCHLORIDE 37.5 MG/1
37.5 TABLET ORAL
Qty: 30 TABLET | Refills: 3 | Status: SHIPPED | OUTPATIENT
Start: 2025-01-23

## 2025-01-23 NOTE — PROGRESS NOTES
HISTORY OF PRESENT ILLNESS  Chief Complaint   Patient presents with    Weight Check     Down 3     Kathie Rothman is a 54 year old female here for follow up with medical weight loss program for the treatment of overweight, obesity, or morbid obesity.     Down 3 lbs (f/u from 8/2024)  Compliant on wegovy 2.4mg weekly and phentermine 37.5mg     Tolerating well, helping with decreasing appetite and no side effects     Success: my loss of weight of #50 lbs  Challenging: staying away from sugar  Exercise/Activity: 5x/ week, via walking, free weights (1 days per week)   Nutrition: eating regular meals, +protein, minimal veggies. +interm.. tracking reports  Meals out per week on average: 2  Stress is manageable   Sleep: 6 hours/night, waking up feeling rested    Denies chest pain, shortness of breath, dizziness, blurred vision, headache, paresthesia, nausea/vomiting.     Breakfast Lunch Dinner Snacks Fluids   Reviewed              Wt Readings from Last 6 Encounters:   01/23/25 (!) 315 lb (142.9 kg)   10/31/24 (!) 311 lb (141.1 kg)   08/30/24 (!) 319 lb (144.7 kg)   08/29/24 (!) 318 lb (144.2 kg)   06/12/24 (!) 317 lb (143.8 kg)   04/11/24 (!) 320 lb (145.2 kg)          REVIEW OF SYSTEMS  GENERAL: feels well otherwise, denied any fevers chills or night sweats   LUNGS: denies shortness of breath  CARDIOVASCULAR: denies chest pain  GI: denies abdominal pain  MUSCULOSKELETAL: denies back pain, joint pains   PSYCH: denies change in behavior or mood, denies feeling sad or depressed    EXAM  /70   Pulse 88   Resp 16   Ht 5' 8\" (1.727 m)   Wt (!) 315 lb (142.9 kg)   LMP  (LMP Unknown)   BMI 47.90 kg/m²       GENERAL: well developed, well nourished, in no apparent distress, A/O x3  SKIN: no rashes, no suspicious lesions  HEENT: atraumatic, normocephalic, OP-clear, PERRLA  NECK: supple, no adenopathy  LUNGS: CTA in all fields, breathing non labored  CARDIO: RRR without murmur  GI: +BS, NT/ND, no masses or  HSM  EXTREMITIES: no cyanosis, no clubbing, no edema    No results found for: \"GLU\", \"BUN\", \"BUNCREA\", \"CREATSERUM\", \"ANIONGAP\", \"GFR\", \"GFRNAA\", \"GFRAA\", \"CA\", \"OSMOCALC\", \"ALKPHO\", \"AST\", \"ALT\", \"ALKPHOS\", \"BILT\", \"TP\", \"ALB\", \"GLOBULIN\", \"AGRATIO\", \"NA\", \"K\", \"CL\", \"CO2\"  Lab Results   Component Value Date     11/11/2022    A1C 5.7 (H) 11/11/2022     No results found for: \"CHOLEST\", \"TRIG\", \"HDL\", \"LDL\", \"VLDL\", \"TCHDLRATIO\", \"NONHDLC\", \"CHOLHDLRATIO\", \"CALCNONHDL\"  No results found for: \"B12\", \"VITB12\"  No results found for: \"VITD\", \"QVITD\", \"VOID02ZS\"    Medications Ordered Prior to Encounter[1]    ASSESSMENT/PLAN    ICD-10-CM    1. Therapeutic drug monitoring  Z51.81       2. Obesity, morbid, BMI 50 or higher (HCC)  E66.01       3. PCOS (polycystic ovarian syndrome)  E28.2       4. Other sleep apnea  G47.39       5. Prediabetes  R73.03       6. History of laparoscopic adjustable gastric banding  Z98.84           PLAN   Initial Weight Data and Goal Weight Loss:  Initial consult: #383 lbs on 11/2022  Weight Calculations  Initial Weight: 383 lbs  Initial Weight Date: 11/01/22  Today's Weight: 315 lbs  5% Goal: 19.15  10% Goal: 38.3  Total Weight Loss: 68 lbs  Total weight loss: Down 3 lbs total, Net loss 68 lbs  Continue with medications: wegovy 2.4mg weekly (discussed possibly switching to zepbound if approved by insurance)   Continue with medications: phentermine 37.5mg   Continue with medication: multivitamin   --advised of side effects and adverse effects of this medication  Contradictions: none  Reviewed labs (from outside [facility])   PCOS, has done metformin in the past   Snoring, +bairon- doesn't use a CPAP machine  Hx of lap band in 2010, stable   Hx Prediabetes- last a1c was 5.2% on 8/15/2024  Wrote out new macros and encouraged tracking food   Nutrition: Low carb diet, recommended to eat breakfast daily/ regular protein intake  Follow up with dietitian and psychologist as recommended.  Discussed the  role of sleep and stress in weight management.  Counseled on comprehensive weight loss plan including attention to nutrition, exercise and behavior/stress management for success. See patient instruction below for more details.  Discussed strategies to overcome barriers to successful weight loss and weight maintenance  FITTE: ACSM recommendations (150-300 minutes/ week in active weight loss)   Weight Loss Consent to treat reviewed and signed.    Total time spent on chart review, pre-charting, obtaining history, counseling, and educating, reviewing labs was 30 minutes.       NOTE TO PATIENT: The 21st Century Cures Act makes clinical notes like these available to patients in the interest of transparency. Clinical notes are medical documents used by physicians and care providers to communicate with each other. These documents include medical language and terminology, abbreviations, and treatment information that may sound technical and at times possibly unfamiliar. In addition, at times, the verbiage may appear blunt or direct. These documents are one tool providers use to communicate relevant information and clinical opinions of the care providers in a way that allows common understanding of the clinical context.     There are no Patient Instructions on file for this visit.    No follow-ups on file.    Patient verbalizes understanding.    TOAN Rodriguez             [1]   Current Outpatient Medications on File Prior to Visit   Medication Sig Dispense Refill    semaglutide-weight management (WEGOVY) 2.4 MG/0.75ML Subcutaneous Solution Auto-injector INJECT 0.75 ML (2.4 MG TOTAL) ONCE A WEEK AS DIRECTED 9 mL 1    Phentermine HCl 37.5 MG Oral Tab Take 1 tablet (37.5 mg total) by mouth before breakfast. 30 tablet 3    cyclobenzaprine 5 MG Oral Tab Take 1 tablet (5 mg total) by mouth 3 (three) times daily as needed.       No current facility-administered medications on file prior to visit.

## 2025-01-23 NOTE — PATIENT INSTRUCTIONS
Next steps:  1.  Fill your prescribed medication and take as discussed and prescribed: wegovy 2.4mg weekly and phentermine   2.  Schedule a personal nutrition consultation with one of our registered dieticians  3. Ask insurance about coverage on zepbound      Please try to work on the following dietary changes:  Daily protein recommendation to start:  grams  Daily carbohydrate: <165g  Daily calories: 2,000  1.  Drink water with meals and throughout the day, cut down on soda and/or juice if consumed. Consider flavored water options like Bubbly, Spindrift, Hint and Rey.  2.  Eat breakfast daily and focus on having protein with each meal, examples include: greek yogurt, cottage cheese, hard boiled egg, whole grain toast with peanut butter.   3.  Reduce refined carbohydrates and sugars which includes items such as sweets, as well as rice, pasta, and bread and make sure to choose whole grain options when having them with just 1 serving per meal about the size of your inner palm.  4.  Consume non starchy veggies daily working towards making them a good 50% of your daily food intake. Add them to lunch and dinner consistently.  5.  Start a daily probiotic: VSL#3 is recommended, (order on line at www.vsl3.com). Take 1 capsule daily with water for 30 days, then reduce to 1 every other day (this will reduce the cost). Capsules can be left out for 2 weeks, but then must be refrigerated.      Please download venus My Fitness Pal, LoseIt! Or Net Diary to monitor daily dietary intake and you will be able to see if you are eating the right amount of calories or too much or too little which would hinder weight loss. Additionally this will help to see your daily carbohydrate and protein intake. When you set the venus up choose 1-2 lbs/week as a goal.  Keeping a paper food journal is an option as well to remain accountable for your choices- this is the start to mindful eating! A low calorie diet has been consistently shown to  support weight loss.     Continue or start exercising to help establish a routine. If not already exercising begin with 1 day and progress as able with long-term goal of 30 minutes 5 days a week at a minimum.     Meditation daily can help manage and control stress. Chronic stress can make weight loss difficult.  Exercising is one way to help with stress, but meditation using the CALM Colette or another comparable alternative can be done in your home or place of work with little time commitment. This Colette can also help work on behavior change and improve sleep. Check out the segment under Calm Masterclass and listen to The 4 Pillars of Health. A great way to begin learning about the foundation of lifestyle with practical tips to use in your every day.     Check out www.yourweightmatters.org blog for continued daily support and education along this weight loss journey!    Patient Resources:     Personal Training/Fitness Classes/Health Coaching     [facility] @ https://www.eehealth.org/healthy-driven/fitness-center Full fitness center with group fitness and personal training. Discount available as client of [employer].  Health Coaching and Personal Training with Ninfa Del Angel at our [facility]- individual weekly coaching with option to add personal training and small group fitness classes targeted at weight loss- [phone] and/or email @ [email or web address]  360FIT [place] http://www.Run The Campaign. Group Fitness [phone] and/or email Talia at talia@Run [email or web address] Campaign  [facility]\""ed Fitness Centers with multiple locations: Everywun (www.Vorbeck Materials), Eat The Frog Fitness (www.MetaNotes.Aquaporin), Fit Body Bootcamp (www.FeedVisorbodybootcamp.com), Acupera Fitness (www.Smart Pipe.Aquaporin), The Exercise  (www.exercisecoach.Aquaporin)     Online Fitness  Fitness  on Keemotion  Fit in 10 DVD series- www.gkgxw05BJD.Aquaporin  Sit  and Be Fit - Chair exercise series Www.sitandbefit.org  Hip Hop Fit with Israel Faustin at www.hiphopfit.net     Apps for on the Go Fitness  [place] 7 Minute Workout (orange box with white 7) - free on the go HIIT training colette  Peloton Colette @ www.onepeloton.com     Nutrition Trackers and Tools  LoseIT! And My Fitness Pal apps and on line for tracking nutrition  NOOM - virtual health coaching  FitFoundation (healthy meals on the go) in [place] @ www.wdabxzurbnlon8kStonestreet One  Cynthia RODRIGUEZ @ wwwLanguage Systemsbistromd.com and Lcnccy16 (keto and low carb plans recommended) @ www.xxjxxn69.com, Metabolic Meals @ www.TextualAdsMetabolicMeals.Typesafe - individual prepared meals to go  Gobble, Blue Apron, Home , Every Plate, Sunbasket- on line meal delivery programs for preparation at home  Meal Village in [place] for homemade meals to go @ www.mealBrain Tunnelgenix Technologiesage.Typesafe  Diet Doctor @ www.dietdoctor.Typesafe - low carb swaps  Yummly - meal prep and planning colette (www.yummly.com)     Stress Management/Behavior/Mindful Eating  CALM meditation colette (www.calm.com)  Headspace  Am I Hungry? Mindful eating virtual  colette  Www.yourweightmatters.org - Obesity Action Coalition sponsored Blog posts daily  Motivation colette (black box with white \")- daily supportive messages sent to your phone     Books/Video Education/Podcasts  Mindless Eating by Jim Taylor  Why We Get Sick by Michael Figueroa (a book about insulin resistance)  Atomic Habits by Chuck Underwood (a book about taking small steps to promote greater behavior change)   Can't Hurt Me by Luciano Ng (a book exploring the power of discipline in achieving your goals)  The End of Dieting: How to Live for Life by Dr. Eugene Killian M.D. or listen to The Chargemaster Podcast Episode 63: Understanding \"Nutritarian\" Eating w/Dr. Eugene Killian  Your Body in Balance: The New Science of Food, Hormones, and Health by Dr. Theodore Abreu  The Menopause Diet Plan by Lauren Greer and Krysta Hilario  The Complete Guide to fasting by   Morgan  Sugar, Salt & Fat by Nicole Dorsey, Ph.D, R.D.  Weight Loss Surgery Will Not Treat Food Addiction by Mickie Apple Ph.D  The Game Changers- Plingaix Documentary on plant based nutrition  Fed Up - documentary about obesity (Free on Utube)  The Truth About Sugar - documentary on sugar (Free on Utube, https://youtu.be/2T2unbeZM9y)  The Dr. Varma T5 Wellness Plan by Dr. Freddy Varma MD  Fitlosophy Fitspiration - journal to better health (found at [employer] in fitness aisle)  What Happened to You?- a look at the impact trauma has on behavior written by Francia Patel and Dr. Topher Barragan  Whole Again by Tobin Duckworth - discovering your true self after trauma  Gabby Flores talk on [place], The Call to Courage  Podcasts: The Exam Room by the Physician's Committee, Nutrition Facts by Dr. He    We are here to support you with weight loss, but please remember that you still need your primary care provider for your routine health maintenance.

## 2025-06-05 ENCOUNTER — PATIENT MESSAGE (OUTPATIENT)
Dept: INTERNAL MEDICINE CLINIC | Facility: CLINIC | Age: 55
End: 2025-06-05

## 2025-06-05 DIAGNOSIS — E66.01 OBESITY, MORBID, BMI 50 OR HIGHER (HCC): ICD-10-CM

## 2025-06-05 DIAGNOSIS — G47.39 OTHER SLEEP APNEA: ICD-10-CM

## 2025-06-05 DIAGNOSIS — Z51.81 THERAPEUTIC DRUG MONITORING: ICD-10-CM

## 2025-06-05 DIAGNOSIS — E66.01 OBESITY, MORBID, BMI 50 OR HIGHER (HCC): Primary | ICD-10-CM

## 2025-06-05 DIAGNOSIS — R73.03 PREDIABETES: ICD-10-CM

## 2025-06-05 DIAGNOSIS — E28.2 PCOS (POLYCYSTIC OVARIAN SYNDROME): ICD-10-CM

## 2025-06-05 RX ORDER — PHENTERMINE HYDROCHLORIDE 37.5 MG/1
37.5 TABLET ORAL
Qty: 90 TABLET | Refills: 0 | Status: SHIPPED | OUTPATIENT
Start: 2025-06-05

## 2025-06-05 RX ORDER — SEMAGLUTIDE 2.4 MG/.75ML
2.4 INJECTION, SOLUTION SUBCUTANEOUS WEEKLY
Qty: 9 ML | Refills: 0 | OUTPATIENT
Start: 2025-06-05

## 2025-06-05 NOTE — TELEPHONE ENCOUNTER
Requesting   Requested Prescriptions     Pending Prescriptions Disp Refills    Phentermine HCl 37.5 MG Oral Tab 30 tablet 3     Sig: Take 1 tablet (37.5 mg total) by mouth every morning before breakfast.       LOV: 1/23/2025  RTC: 8/28/2025  Last Relevant Labs: na  Filled: 1/23/2025 #30 tablet with 3 refills    Future Appointments   Date Time Provider Department Center   8/7/2025 11:00 AM [facility] Fuller MD [id number]   8/28/2025 11:00 AM Ana Rosa Reyes APRN [facility] [id number]

## 2025-06-08 NOTE — TELEPHONE ENCOUNTER
Requesting wegovy restart?  LOV: 1/23/25  RTC: 5 months  Last Relevant Labs: na  Filled: 1/23/25 #9 ml with 0 refills  2.4 mg dose    8/28/2025 11:00 AM Ana Rosa Reyes APRN [facility] [id number]     Last dose was April!

## 2025-06-09 RX ORDER — SEMAGLUTIDE 0.5 MG/.5ML
0.5 INJECTION, SOLUTION SUBCUTANEOUS WEEKLY
Qty: 2 ML | Refills: 0 | Status: SHIPPED | OUTPATIENT
Start: 2025-06-30 | End: 2025-07-22

## 2025-08-28 ENCOUNTER — OFFICE VISIT (OUTPATIENT)
Dept: INTERNAL MEDICINE CLINIC | Facility: CLINIC | Age: 55
End: 2025-08-28

## 2025-08-28 VITALS
SYSTOLIC BLOOD PRESSURE: 136 MMHG | RESPIRATION RATE: 18 BRPM | WEIGHT: 293 LBS | DIASTOLIC BLOOD PRESSURE: 82 MMHG | BODY MASS INDEX: 44.41 KG/M2 | OXYGEN SATURATION: 98 % | HEART RATE: 92 BPM | HEIGHT: 68 IN

## 2025-08-28 DIAGNOSIS — Z51.81 THERAPEUTIC DRUG MONITORING: Primary | ICD-10-CM

## 2025-08-28 DIAGNOSIS — E28.2 PCOS (POLYCYSTIC OVARIAN SYNDROME): ICD-10-CM

## 2025-08-28 DIAGNOSIS — R73.03 PREDIABETES: ICD-10-CM

## 2025-08-28 DIAGNOSIS — Z98.84 HISTORY OF LAPAROSCOPIC ADJUSTABLE GASTRIC BANDING: ICD-10-CM

## 2025-08-28 DIAGNOSIS — G47.39 OTHER SLEEP APNEA: ICD-10-CM

## 2025-08-28 DIAGNOSIS — E66.01 OBESITY, MORBID, BMI 50 OR HIGHER (HCC): ICD-10-CM

## 2025-08-28 RX ORDER — DIETHYLPROPION HYDROCHLORIDE 75 MG/1
1 TABLET, EXTENDED RELEASE ORAL EVERY MORNING
Qty: 30 TABLET | Refills: 3 | Status: SHIPPED | OUTPATIENT
Start: 2025-08-28

## 2025-08-28 RX ORDER — TIRZEPATIDE 2.5 MG/.5ML
2.5 INJECTION, SOLUTION SUBCUTANEOUS WEEKLY
Qty: 2 ML | Refills: 2 | Status: SHIPPED | OUTPATIENT
Start: 2025-08-28

## (undated) NOTE — Clinical Note
Padmini Eaton, :1970    CONSENT FOR PROCEDURE/SEDATION    1. I authorize the performance upon Padmini Eaton  the following: IUD Removal    2.  I authorize Dr. Dimitri Diaz MD (and whomever is designated as the doctor’s assistant), to pe Witness: _________________________________________ Date:___________     Physician Signature: _______________________________ Date:___________

## (undated) NOTE — MR AVS SNAPSHOT
[address] at [address], [place] [address]  [phone]               Thank you for choosing us for your health care visit with Catarina Duane, MD.  We are glad to serve you and happy to provide you with [address] [place], [address] [facility], [address] [place] [address]. [address] [place] [address]    It is the patient's responsibility to check with and follow their insurance company Tips for increasing your physical activity – Adults who are physically active are less likely to develop some chronic diseases than adults who are inactive.      HOW TO GET STARTED: HOW TO STAY MOTIVATED:   Start activities slowly and build up over time Do

## (undated) NOTE — LETTER
Date & Time: 6/12/2024, 4:16 PM  Patient: Kathie Rothman  Encounter Provider(s):    Lisy Cruz MD       To Whom It May Concern:    Kathie Rothman was seen and treated in our department on 6/12/2024. She should not return to work until Monday, June 17, 2024.  Mandatory mask wearing until Saturday, June 22, 2024. .    If you have any questions or concerns, please do not hesitate to call.        _____________________________  Physician/APC Signature

## (undated) NOTE — MR AVS SNAPSHOT
After Visit Summary   2/7/2020    Poncho Borrego    MRN: [id number]           Visit Information     Date & Time  2/7/2020 10:00 AM Provider  Ada Rosario [address], [facility] Dept.  Phone  33 Dietary sodium reduction Reduce dietary sodium intake to <= 100 mmol per day (2.4 g sodium or 6 g sodium chloride)   Aerobic physical activity Regular aerobic physical activity (e.g., brisk walking, light jogging, cycling, swimming, etc.) for a goal of at patients. Video Visits are available Monday - Friday for many common conditions such as allergies, colds, cough, fever, rash, sore throat, headache and pink eye.   The cost for a Video Visit is currently $35.         If you receive a survey from [employer] Saturday – Sunday  10:00 am – 4:00 pm  WALK-IN CARE  Emergency Medicine Providers  Conditions needing urgent attention, but are   non-life-threatening.     Also available by appointment     Average cost  $120*     EMERGENCY ROOM         Life-threatening maria eugenia

## (undated) NOTE — LETTER
Rose Dumont, :1970    CONSENT FOR PROCEDURE/SEDATION    1. I authorize the performance upon Rose Dumont  the following: IUD REMOVAL REINSERTION    2.  I authorize Dr. Sebas Aguilar MD (and whomever is designated as the doctor’s chelsea Relationship to patient: ____________________________________________    Witness: _________________________________________ Date:___________     Physician Signature: _______________________________ Date:___________

## (undated) NOTE — LETTER
AUTHORIZATION FOR SURGICAL OPERATION OR OTHER PROCEDURE    1. I hereby authorize Dr. Fuller, and [facility] staff assigned to my case to perform the following operation and/or procedure at the [facility] site:    _______________________________________________________________________________________________    IUD REMOVAL  _______________________________________________________________________________________________    2.  My physician has explained the nature and purpose of the operation or other procedure, possible alternative methods of treatment, the risks involved, and the possibility of complication to me.  I acknowledge that no guarantee has been made as to the result that may be obtained.  3.  I recognize that, during the course of this operation, or other procedure, unforseen conditions may necessitate additional or different procedure than those listed above.  I, therefore, further authorize and request that the above named physician, his/her physician assistants or designees perform such procedures as are, in his/her professional opinion, necessary and desirable.  4.  Any tissue or organs removed in the operation or other procedure may be disposed of by and at the discretion of the [facility] and [facility].  5.  I understand that in the event of a medical emergency, I will be transported by local paramedics to [facility] or other hospital emergency department.  6.  I certify that I have read and fully understand the above consent to operation and/or other procedure.    7.  I acknowledge that my physician has explained sedation/analgesia administration to me including the risks and benefits.  I consent to the administration of sedation/analgesia as may be necessary or desirable in the judgement of my physician.    Witness signature: ___________________________________________________ Date:  ______/______/_____                    Time:   ________ A.M.  P.M.       Patient Name:  ______________________________________________________  (please print)      Patient signature:  ___________________________________________________             Relationship to Patient:           []  Parent    Responsible person                          []  Spouse  In case of minor or                    [] Other  _____________   Incompetent name:  __________________________________________________                               (please print)      _____________      Responsible person  In case of minor or  Incompetent signature:  _______________________________________________    Statement of Physician  My signature below affirms that prior to the time of the procedure, I have explained to the patient and/or his/her guardian, the risks and benefits involved in the proposed treatment and any reasonable alternative to the proposed treatment.  I have also explained the risks and benefits involved in the refusal of the proposed treatment and have answered the patient's questions.                        Date:  ______/______/_______  Provider                      Signature:  __________________________________________________________       Time:  ___________ A.M    P.M.

## (undated) NOTE — MR AVS SNAPSHOT
After Visit Summary   4/9/2021    Mortimer Angles    MRN: [id number]           Visit Information     Date & Time  4/9/2021 10:00 AM Provider  Ada Yung [address], [place] Dept.  Phone  33 YOU KNOW WHERE TO GO? Injury & Illness are never convenient. If you are dealing with a   non-emergency, consider your options before heading to an ER.   VIDEO VISITS  Visit face-to-face with a [facility] physician or   [facility] using your mobile device or computer   u

## (undated) NOTE — LETTER
[facility] [address]  Dept: [phone]  Dept Fax: [phone]      January 16, 2018    Patient: Padmini Eaton   Date of Visit: 1/16/2018       To Whom It May Concern:    Ernie Eubanks was seen